# Patient Record
Sex: FEMALE | Race: WHITE | NOT HISPANIC OR LATINO | Employment: PART TIME | URBAN - METROPOLITAN AREA
[De-identification: names, ages, dates, MRNs, and addresses within clinical notes are randomized per-mention and may not be internally consistent; named-entity substitution may affect disease eponyms.]

---

## 2017-01-10 ENCOUNTER — HOSPITAL ENCOUNTER (OUTPATIENT)
Dept: LAB | Facility: MEDICAL CENTER | Age: 57
End: 2017-01-10
Attending: INTERNAL MEDICINE
Payer: COMMERCIAL

## 2017-01-10 LAB
ALBUMIN SERPL BCP-MCNC: 3.6 G/DL (ref 3.2–4.9)
ALBUMIN/GLOB SERPL: 1.2 G/DL
ALP SERPL-CCNC: 67 U/L (ref 30–99)
ALT SERPL-CCNC: 10 U/L (ref 2–50)
ANION GAP SERPL CALC-SCNC: 8 MMOL/L (ref 0–11.9)
AST SERPL-CCNC: 17 U/L (ref 12–45)
BASOPHILS # BLD AUTO: 0.16 K/UL (ref 0–0.12)
BASOPHILS NFR BLD AUTO: 1.7 % (ref 0–1.8)
BILIRUB SERPL-MCNC: 0.4 MG/DL (ref 0.1–1.5)
BUN SERPL-MCNC: 28 MG/DL (ref 8–22)
CALCIUM SERPL-MCNC: 9.7 MG/DL (ref 8.5–10.5)
CHLORIDE SERPL-SCNC: 106 MMOL/L (ref 96–112)
CHOLEST SERPL-MCNC: 217 MG/DL (ref 100–199)
CO2 SERPL-SCNC: 30 MMOL/L (ref 20–33)
CREAT SERPL-MCNC: 1.06 MG/DL (ref 0.5–1.4)
CREAT UR-MCNC: 68.6 MG/DL
EOSINOPHIL # BLD: 0.44 K/UL (ref 0–0.51)
EOSINOPHIL NFR BLD AUTO: 4.6 % (ref 0–6.9)
ERYTHROCYTE [DISTWIDTH] IN BLOOD BY AUTOMATED COUNT: 48.1 FL (ref 35.9–50)
EST. AVERAGE GLUCOSE BLD GHB EST-MCNC: 117 MG/DL
GLOBULIN SER CALC-MCNC: 3 G/DL (ref 1.9–3.5)
GLUCOSE SERPL-MCNC: 56 MG/DL (ref 65–99)
HBA1C MFR BLD: 5.7 % (ref 0–5.6)
HCT VFR BLD AUTO: 48.5 % (ref 37–47)
HDLC SERPL-MCNC: 44 MG/DL
HGB BLD-MCNC: 15.4 G/DL (ref 12–16)
IMM GRANULOCYTES # BLD AUTO: 0.02 K/UL (ref 0–0.11)
IMM GRANULOCYTES NFR BLD AUTO: 0.2 % (ref 0–0.9)
LDLC SERPL CALC-MCNC: 108 MG/DL
LYMPHOCYTES # BLD: 2.6 K/UL (ref 1–4.8)
LYMPHOCYTES NFR BLD AUTO: 27.4 % (ref 22–41)
MCH RBC QN AUTO: 27.3 PG (ref 27–33)
MCHC RBC AUTO-ENTMCNC: 31.8 G/DL (ref 33.6–35)
MCV RBC AUTO: 85.8 FL (ref 81.4–97.8)
MICROALBUMIN UR-MCNC: 19.8 MG/DL
MICROALBUMIN/CREAT UR: 289 MG/G (ref 0–30)
MONOCYTES # BLD: 0.63 K/UL (ref 0–0.85)
MONOCYTES NFR BLD AUTO: 6.6 % (ref 0–13.4)
NEUTROPHILS # BLD: 5.64 K/UL (ref 2–7.15)
NEUTROPHILS NFR BLD AUTO: 59.5 % (ref 44–72)
NRBC # BLD AUTO: 0 K/UL
NRBC BLD-RTO: 0 /100 WBC
PLATELET # BLD AUTO: 265 K/UL (ref 164–446)
PMV BLD AUTO: 11.8 FL (ref 9–12.9)
POTASSIUM SERPL-SCNC: 4.2 MMOL/L (ref 3.6–5.5)
PROT SERPL-MCNC: 6.6 G/DL (ref 6–8.2)
RBC # BLD AUTO: 5.65 M/UL (ref 4.2–5.4)
SODIUM SERPL-SCNC: 144 MMOL/L (ref 135–145)
TRIGL SERPL-MCNC: 326 MG/DL (ref 0–149)
WBC # BLD AUTO: 9.5 K/UL (ref 4.8–10.8)

## 2017-01-10 PROCEDURE — 82570 ASSAY OF URINE CREATININE: CPT

## 2017-01-10 PROCEDURE — 36415 COLL VENOUS BLD VENIPUNCTURE: CPT

## 2017-01-10 PROCEDURE — 80053 COMPREHEN METABOLIC PANEL: CPT

## 2017-01-10 PROCEDURE — 83036 HEMOGLOBIN GLYCOSYLATED A1C: CPT

## 2017-01-10 PROCEDURE — 85025 COMPLETE CBC W/AUTO DIFF WBC: CPT

## 2017-01-10 PROCEDURE — 80061 LIPID PANEL: CPT

## 2017-01-10 PROCEDURE — 82043 UR ALBUMIN QUANTITATIVE: CPT

## 2017-03-07 ENCOUNTER — HOSPITAL ENCOUNTER (OUTPATIENT)
Dept: RADIOLOGY | Facility: MEDICAL CENTER | Age: 57
End: 2017-03-07
Attending: SURGERY
Payer: COMMERCIAL

## 2017-03-07 DIAGNOSIS — I70.209 ATHEROSCLEROSIS OF NATIVE ARTERIES OF THE EXTREMITIES, UNSPECIFIED: ICD-10-CM

## 2017-03-07 PROCEDURE — 93926 LOWER EXTREMITY STUDY: CPT | Mod: 26 | Performed by: SURGERY

## 2017-03-07 PROCEDURE — 93922 UPR/L XTREMITY ART 2 LEVELS: CPT

## 2017-03-07 PROCEDURE — 93922 UPR/L XTREMITY ART 2 LEVELS: CPT | Mod: 26 | Performed by: SURGERY

## 2017-03-07 PROCEDURE — 93926 LOWER EXTREMITY STUDY: CPT

## 2017-04-25 ENCOUNTER — HOSPITAL ENCOUNTER (OUTPATIENT)
Dept: LAB | Facility: MEDICAL CENTER | Age: 57
End: 2017-04-25
Attending: PHYSICIAN ASSISTANT
Payer: COMMERCIAL

## 2017-04-25 LAB
ALBUMIN SERPL BCP-MCNC: 4 G/DL (ref 3.2–4.9)
ALBUMIN/GLOB SERPL: 1.5 G/DL
ALP SERPL-CCNC: 66 U/L (ref 30–99)
ALT SERPL-CCNC: 13 U/L (ref 2–50)
ANION GAP SERPL CALC-SCNC: 7 MMOL/L (ref 0–11.9)
AST SERPL-CCNC: 21 U/L (ref 12–45)
BILIRUB SERPL-MCNC: 0.4 MG/DL (ref 0.1–1.5)
BUN SERPL-MCNC: 37 MG/DL (ref 8–22)
CALCIUM SERPL-MCNC: 8.8 MG/DL (ref 8.5–10.5)
CHLORIDE SERPL-SCNC: 105 MMOL/L (ref 96–112)
CHOLEST SERPL-MCNC: 202 MG/DL (ref 100–199)
CO2 SERPL-SCNC: 28 MMOL/L (ref 20–33)
CREAT SERPL-MCNC: 1.18 MG/DL (ref 0.5–1.4)
EST. AVERAGE GLUCOSE BLD GHB EST-MCNC: 123 MG/DL
GFR SERPL CREATININE-BSD FRML MDRD: 47 ML/MIN/1.73 M 2
GLOBULIN SER CALC-MCNC: 2.6 G/DL (ref 1.9–3.5)
GLUCOSE SERPL-MCNC: 79 MG/DL (ref 65–99)
HBA1C MFR BLD: 5.9 % (ref 0–5.6)
HDLC SERPL-MCNC: 45 MG/DL
LDLC SERPL CALC-MCNC: 107 MG/DL
POTASSIUM SERPL-SCNC: 4.6 MMOL/L (ref 3.6–5.5)
PROT SERPL-MCNC: 6.6 G/DL (ref 6–8.2)
SODIUM SERPL-SCNC: 140 MMOL/L (ref 135–145)
TRIGL SERPL-MCNC: 250 MG/DL (ref 0–149)

## 2017-04-25 PROCEDURE — 80053 COMPREHEN METABOLIC PANEL: CPT

## 2017-04-25 PROCEDURE — 83036 HEMOGLOBIN GLYCOSYLATED A1C: CPT

## 2017-04-25 PROCEDURE — 80061 LIPID PANEL: CPT

## 2017-04-25 PROCEDURE — 36415 COLL VENOUS BLD VENIPUNCTURE: CPT

## 2017-07-03 ENCOUNTER — HOSPITAL ENCOUNTER (EMERGENCY)
Facility: MEDICAL CENTER | Age: 57
End: 2017-07-03
Attending: EMERGENCY MEDICINE
Payer: COMMERCIAL

## 2017-07-03 VITALS
WEIGHT: 256.62 LBS | RESPIRATION RATE: 18 BRPM | HEIGHT: 64 IN | HEART RATE: 81 BPM | SYSTOLIC BLOOD PRESSURE: 141 MMHG | DIASTOLIC BLOOD PRESSURE: 62 MMHG | TEMPERATURE: 98.6 F | OXYGEN SATURATION: 93 % | BODY MASS INDEX: 43.81 KG/M2
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
## 2017-07-03 DIAGNOSIS — M54.50 ACUTE MIDLINE LOW BACK PAIN WITHOUT SCIATICA: ICD-10-CM

## 2017-07-03 PROCEDURE — 99283 EMERGENCY DEPT VISIT LOW MDM: CPT

## 2017-07-03 RX ORDER — LIDOCAINE 50 MG/G
1 PATCH TOPICAL EVERY 24 HOURS
Qty: 10 PATCH | Refills: 1 | Status: SHIPPED | OUTPATIENT
Start: 2017-07-03

## 2017-07-03 RX ORDER — PREDNISONE 20 MG/1
40 TABLET ORAL DAILY
Qty: 12 TAB | Refills: 0 | Status: SHIPPED | OUTPATIENT
Start: 2017-07-03 | End: 2017-07-09

## 2017-07-03 ASSESSMENT — LIFESTYLE VARIABLES: DO YOU DRINK ALCOHOL: NO

## 2017-07-03 NOTE — ED NOTES
Ambulates to triage with a cane  Chief Complaint   Patient presents with   • Low Back Pain     fell last Tueday and landed on her back, denies any numbness/tingling or shooting pain to legs     -LOC, VSS, has been taking oxycodone for her pain, last dose was at 7am, meds not helping.

## 2017-07-03 NOTE — ED AVS SNAPSHOT
7/3/2017    Suly Rodríguez  [address] [place] [address]    Dear Suly:    [facility] wants to ensure your discharge home is safe and you or your loved ones have had all of your questions answered regarding your care after you leave the hospital.    Below is a list of resources and contact information should you have any questions regarding your hospital stay, follow-up instructions, or active medical symptoms.    Questions or Concerns Regarding… Contact   Medical Questions Related to Your Discharge  (7 days a week, 8am-5pm) Contact a Nurse Care Coordinator   [phone]   Medical Questions Not Related to Your Discharge  (24 hours a day / 7 days a week)  Contact the Nurse Health Line   [phone]    Medications or Discharge Instructions Refer to your discharge packet   or contact your [facility] Primary Care Provider   [phone]   Follow-up Appointment(s) Schedule your appointment via [employer]   or contact Scheduling [phone]   Billing Review your statement via [employer]  or contact Billing [phone]   Medical Records Review your records via [employer]   or contact Medical Records [phone]     You may receive a telephone call within two days of discharge. This call is to make certain you understand your discharge instructions and have the opportunity to have any questions answered. You can also easily access your medical information, test results and upcoming appointments via the [employer] free online health management tool. You can learn more and sign up at [employer]. For assistance setting up your [employer] account, please call [phone].    Once again, we want to ensure your discharge home is safe and that you have a clear understanding of any next steps in your care. If you have any questions or concerns, please do not hesitate to contact us, we are here for you. Thank you for choosing [facility] for your healthcare needs.    Sincerely,    Your [facility] Healthcare Team

## 2017-07-03 NOTE — DISCHARGE INSTRUCTIONS
Back Injury Prevention  Back injuries can be very painful. They can also be difficult to heal. After having one back injury, you are more likely to injure your back again. It is important to learn how to avoid injuring or re-injuring your back. The following tips can help you to prevent a back injury.  WHAT SHOULD I KNOW ABOUT PHYSICAL FITNESS?  · Exercise for 30 minutes per day on most days of the week or as directed by your health care provider. Make sure to:  · Do aerobic exercises, such as walking, jogging, biking, or swimming.  · Do exercises that increase balance and strength, such as darío chi and yoga. These can decrease your risk of falling and injuring your back.  · Do stretching exercises to help with flexibility.  · Try to develop strong abdominal muscles. Your abdominal muscles provide a lot of the support that is needed by your back.  · Maintain a healthy weight.  This helps to decrease your risk of a back injury.  WHAT SHOULD I KNOW ABOUT MY DIET?  · Talk with your health care provider about your overall diet. Take supplements and vitamins only as directed by your health care provider.  · Talk with your health care provider about how much calcium and vitamin D you need each day. These nutrients help to prevent weakening of the bones (osteoporosis). Osteoporosis can cause broken (fractured) bones, which lead to back pain.  · Include good sources of calcium in your diet, such as dairy products, green leafy vegetables, and products that have had calcium added to them (fortified).  · Include good sources of vitamin D in your diet, such as milk and foods that are fortified with vitamin D.  WHAT SHOULD I KNOW ABOUT MY POSTURE?  · Sit up straight and stand up straight. Avoid leaning forward when you sit or hunching over when you stand.  · Choose chairs that have good low-back (lumbar) support.  · If you work at a desk, sit close to it so you do not need to lean over. Keep your chin tucked in. Keep your neck  "drawn back, and keep your elbows bent at a right angle. Your arms should look like the letter \"L.\"  · Sit high and close to the steering wheel when you drive. Add a lumbar support to your car seat, if needed.  · Avoid sitting or standing in one position for very long. Take breaks to get up, stretch, and walk around at least one time every hour. Take breaks every hour if you are driving for long periods of time.  · Sleep on your side with your knees slightly bent, or sleep on your back with a pillow under your knees. Do not lie on the front of your body to sleep.  WHAT SHOULD I KNOW ABOUT LIFTING, TWISTING, AND REACHING?  Lifting and Heavy Lifting  · Avoid heavy lifting, especially repetitive heavy lifting. If you must do heavy lifting:  · Stretch before lifting.  · Work slowly.  · Rest between lifts.  · Use a tool such as a cart or a jenni to move objects if one is available.  · Make several small trips instead of carrying one heavy load.  · Ask for help when you need it, especially when moving big objects.  · Follow these steps when lifting:  · Stand with your feet shoulder-width apart.  · Get as close to the object as you can. Do not try to  a heavy object that is far from your body.  · Use handles or lifting straps if they are available.  · Bend at your knees. Squat down, but keep your heels off the floor.  · Keep your shoulders pulled back, your chin tucked in, and your back straight.  · Lift the object slowly while you tighten the muscles in your legs, abdomen, and buttocks. Keep the object as close to the center of your body as possible.  · Follow these steps when putting down a heavy load:  · Stand with your feet shoulder-width apart.  · Lower the object slowly while you tighten the muscles in your legs, abdomen, and buttocks. Keep the object as close to the center of your body as possible.  · Keep your shoulders pulled back, your chin tucked in, and your back straight.  · Bend at your knees. Squat " down, but keep your heels off the floor.  · Use handles or lifting straps if they are available.  Twisting and Reaching  · Avoid lifting heavy objects above your waist.  · Do not twist at your waist while you are lifting or carrying a load. If you need to turn, move your feet.  · Do not bend over without bending at your knees.  · Avoid reaching over your head, across a table, or for an object on a high surface.  WHAT ARE SOME OTHER TIPS?  · Avoid wet floors and icy ground. Keep sidewalks clear of ice to prevent falls.  · Do not sleep on a mattress that is too soft or too hard.  · Keep items that are used frequently within easy reach.  · Put heavier objects on shelves at waist level, and put lighter objects on lower or higher shelves.  · Find ways to decrease your stress, such as exercise, massage, or relaxation techniques. Stress can build up in your muscles. Tense muscles are more vulnerable to injury.  · Talk with your health care provider if you feel anxious or depressed. These conditions can make back pain worse.  · Wear flat heel shoes with cushioned soles.  · Avoid sudden movements.  · Use both shoulder straps when carrying a backpack.  · Do not use any tobacco products, including cigarettes, chewing tobacco, or electronic cigarettes. If you need help quitting, ask your health care provider.     This information is not intended to replace advice given to you by your health care provider. Make sure you discuss any questions you have with your health care provider.     Document Released: 01/25/2006 Document Revised: 05/03/2016 Document Reviewed: 12/22/2015  [employer] Interactive Patient Education ©2016 [employer]    Back Pain, Adult  Back pain is very common in adults. The cause of back pain is rarely dangerous and the pain often gets better over time. The cause of your back pain may not be known. Some common causes of back pain include:  · Strain of the muscles or ligaments supporting the spine.  · Wear and  tear (degeneration) of the spinal disks.  · Arthritis.  · Direct injury to the back.  For many people, back pain may return. Since back pain is rarely dangerous, most people can learn to manage this condition on their own.  HOME CARE INSTRUCTIONS  Watch your back pain for any changes. The following actions may help to lessen any discomfort you are feeling:  · Remain active. It is stressful on your back to sit or  one place for long periods of time. Do not sit, drive, or  one place for more than 30 minutes at a time. Take short walks on even surfaces as soon as you are able. Try to increase the length of time you walk each day.  · Exercise regularly as directed by your health care provider. Exercise helps your back heal faster. It also helps avoid future injury by keeping your muscles strong and flexible.  · Do not stay in bed. Resting more than 1-2 days can delay your recovery.  · Pay attention to your body when you bend and lift. The most comfortable positions are those that put less stress on your recovering back. Always use proper lifting techniques, including:  ¨ Bending your knees.  ¨ Keeping the load close to your body.  ¨ Avoiding twisting.  · Find a comfortable position to sleep. Use a firm mattress and lie on your side with your knees slightly bent. If you lie on your back, put a pillow under your knees.  · Avoid feeling anxious or stressed. Stress increases muscle tension and can worsen back pain. It is important to recognize when you are anxious or stressed and learn ways to manage it, such as with exercise.  · Take medicines only as directed by your health care provider. Over-the-counter medicines to reduce pain and inflammation are often the most helpful. Your health care provider may prescribe muscle relaxant drugs. These medicines help dull your pain so you can more quickly return to your normal activities and healthy exercise.  · Apply ice to the injured area:  ¨ Put ice in a plastic  bag.  ¨ Place a towel between your skin and the bag.  ¨ Leave the ice on for 20 minutes, 2-3 times a day for the first 2-3 days. After that, ice and heat may be alternated to reduce pain and spasms.  · Maintain a healthy weight. Excess weight puts extra stress on your back and makes it difficult to maintain good posture.  SEEK MEDICAL CARE IF:  · You have pain that is not relieved with rest or medicine.  · You have increasing pain going down into the legs or buttocks.  · You have pain that does not improve in one week.  · You have night pain.  · You lose weight.  · You have a fever or chills.  SEEK IMMEDIATE MEDICAL CARE IF:   · You develop new bowel or bladder control problems.  · You have unusual weakness or numbness in your arms or legs.  · You develop nausea or vomiting.  · You develop abdominal pain.  · You feel faint.     This information is not intended to replace advice given to you by your health care provider. Make sure you discuss any questions you have with your health care provider.     Document Released: 12/18/2006 Document Revised: 01/08/2016 Document Reviewed: 04/21/2015  [employer] Interactive Patient Education ©2016 [employer]

## 2017-07-03 NOTE — ED AVS SNAPSHOT
Home Care Instructions                                                                                                                Suly Rodríguez   MRN: [id number]    Department:  [facility], Emergency Dept   Date of Visit:  7/3/2017            [facility], Emergency Dept    [address] [address]    Phone:  [phone]      You were seen by     Deborah Cooper M.D.      Your Diagnosis Was     Fall, initial encounter     W19.XXXA       Follow-up Information     1. Follow up with Jimmy Song M.D.. Call in 2 days.    Specialty:  Family Medicine    Why:  for recheck, As needed, If symptoms worsen    Contact information    [address]  [address]  [facility] [address]  [phone]          2. Follow up with [facility], Emergency Dept.    Specialty:  Emergency Medicine    Why:  As needed, If symptoms worsen    Contact information    [address]  [place] [address] [phone]      Medication Information     Review all of your home medications and newly ordered medications with your primary doctor and/or pharmacist as soon as possible. Follow medication instructions as directed by your doctor and/or pharmacist.     Please keep your complete medication list with you and share with your physician. Update the information when medications are discontinued, doses are changed, or new medications (including over-the-counter products) are added; and carry medication information at all times in the event of emergency situations.               Medication List      START taking these medications        Instructions    Morning Afternoon Evening Bedtime    lidocaine 5 % Ptch   Commonly known as:  LIDODERM        Apply 1 Patch to skin as directed every 24 hours.   Dose:  1 Patch                        predniSONE 20 MG Tabs   Commonly known as:  DELTASONE        Take 2 Tabs by mouth every day for 6 days.   Dose:  40 mg                          ASK your  doctor about these medications        Instructions    Morning Afternoon Evening Bedtime    alprazolam 0.25 MG Tabs   Commonly known as:  XANAX        Take 0.25 mg by mouth 3 times a day as needed for Sleep or Anxiety. Indications: Feeling Anxious   Dose:  0.25 mg                        amlodipine 10 MG Tabs   Commonly known as:  NORVASC        Take 5 mg by mouth every day. Indications: High Blood Pressure   Dose:  5 mg                        aspirin 81 MG Chew chewable tablet   Commonly known as:  ASA        Take 162 mg by mouth every day.   Dose:  162 mg                        clindamycin 300 MG Caps   Commonly known as:  CLEOCIN        Take 1 Cap by mouth every 8 hours.   Dose:  300 mg                        clopidogrel 75 MG Tabs   Commonly known as:  PLAVIX        Take 1 Tab by mouth every day.   Dose:  75 mg                        cyanocobalamin 500 MCG Tabs   Commonly known as:  VITAMIN B-12        Take 500 mcg by mouth every day.   Dose:  500 mcg                        escitalopram 20 MG tablet   Commonly known as:  LEXAPRO        Take 20 mg by mouth every day.   Dose:  20 mg                        fish oil 1000 MG Caps capsule        Take 1,000 mg by mouth every day.   Dose:  1000 mg                        furosemide 20 MG Tabs   Commonly known as:  LASIX        Take 20 mg by mouth every day.   Dose:  20 mg                        gabapentin 300 MG Caps   Commonly known as:  NEURONTIN        Take 300 mg by mouth every day.   Dose:  300 mg                        insulin aspart 100 UNIT/ML Soln   Commonly known as:  NOVOLOG        Inject 16 Units as instructed BID AC(S).   Dose:  16 Units                        LEVEMIR FLEXTOUCH 100 UNIT/ML Sopn injection   Generic drug:  insulin detemir        Inject 70 Units as instructed every evening.   Dose:  70 Units                        lisinopril 20 MG Tabs   Commonly known as:  PRINIVIL        Take 20 mg by mouth every day.   Dose:  20 mg                         metformin  MG Tb24   Commonly known as:  GLUCOPHAGE XR        Take 500 mg by mouth every day.   Dose:  500 mg                        oxycodone-acetaminophen  MG Tabs   Commonly known as:  PERCOCET-10        Take 1 Tab by mouth every 6 hours as needed for Moderate Pain.   Dose:  1 Tab                        therapeutic multivitamin-minerals Tabs        Take 1 Tab by mouth every day.   Dose:  1 Tab                        vitamin D3 5000 UNITS Caps        Take 1 Cap by mouth every day.   Dose:  1 Cap                        zolpidem 10 MG Tabs   Commonly known as:  AMBIEN        Take 10 mg by mouth at bedtime as needed for Sleep.   Dose:  10 mg                             Where to Get Your Medications      These medications were sent to [id number] - [place] - [address]  [address] [place] [address]     Phone:  [phone]    - lidocaine 5 % Ptch  - predniSONE 20 MG Tabs              Discharge Instructions       Back Injury Prevention  Back injuries can be very painful. They can also be difficult to heal. After having one back injury, you are more likely to injure your back again. It is important to learn how to avoid injuring or re-injuring your back. The following tips can help you to prevent a back injury.  WHAT SHOULD I KNOW ABOUT PHYSICAL FITNESS?  · Exercise for 30 minutes per day on most days of the week or as directed by your health care provider. Make sure to:  · Do aerobic exercises, such as walking, jogging, biking, or swimming.  · Do exercises that increase balance and strength, such as darío chi and yoga. These can decrease your risk of falling and injuring your back.  · Do stretching exercises to help with flexibility.  · Try to develop strong abdominal muscles. Your abdominal muscles provide a lot of the support that is needed by your back.  · Maintain a healthy weight.  This helps to decrease your risk of a back injury.  WHAT SHOULD I KNOW ABOUT MY DIET?  · Talk with  "your health care provider about your overall diet. Take supplements and vitamins only as directed by your health care provider.  · Talk with your health care provider about how much calcium and vitamin D you need each day. These nutrients help to prevent weakening of the bones (osteoporosis). Osteoporosis can cause broken (fractured) bones, which lead to back pain.  · Include good sources of calcium in your diet, such as dairy products, green leafy vegetables, and products that have had calcium added to them (fortified).  · Include good sources of vitamin D in your diet, such as milk and foods that are fortified with vitamin D.  WHAT SHOULD I KNOW ABOUT MY POSTURE?  · Sit up straight and stand up straight. Avoid leaning forward when you sit or hunching over when you stand.  · Choose chairs that have good low-back (lumbar) support.  · If you work at a desk, sit close to it so you do not need to lean over. Keep your chin tucked in. Keep your neck drawn back, and keep your elbows bent at a right angle. Your arms should look like the letter \"L.\"  · Sit high and close to the steering wheel when you drive. Add a lumbar support to your car seat, if needed.  · Avoid sitting or standing in one position for very long. Take breaks to get up, stretch, and walk around at least one time every hour. Take breaks every hour if you are driving for long periods of time.  · Sleep on your side with your knees slightly bent, or sleep on your back with a pillow under your knees. Do not lie on the front of your body to sleep.  WHAT SHOULD I KNOW ABOUT LIFTING, TWISTING, AND REACHING?  Lifting and Heavy Lifting  · Avoid heavy lifting, especially repetitive heavy lifting. If you must do heavy lifting:  · Stretch before lifting.  · Work slowly.  · Rest between lifts.  · Use a tool such as a cart or a jenni to move objects if one is available.  · Make several small trips instead of carrying one heavy load.  · Ask for help when you need it, " especially when moving big objects.  · Follow these steps when lifting:  · Stand with your feet shoulder-width apart.  · Get as close to the object as you can. Do not try to  a heavy object that is far from your body.  · Use handles or lifting straps if they are available.  · Bend at your knees. Squat down, but keep your heels off the floor.  · Keep your shoulders pulled back, your chin tucked in, and your back straight.  · Lift the object slowly while you tighten the muscles in your legs, abdomen, and buttocks. Keep the object as close to the center of your body as possible.  · Follow these steps when putting down a heavy load:  · Stand with your feet shoulder-width apart.  · Lower the object slowly while you tighten the muscles in your legs, abdomen, and buttocks. Keep the object as close to the center of your body as possible.  · Keep your shoulders pulled back, your chin tucked in, and your back straight.  · Bend at your knees. Squat down, but keep your heels off the floor.  · Use handles or lifting straps if they are available.  Twisting and Reaching  · Avoid lifting heavy objects above your waist.  · Do not twist at your waist while you are lifting or carrying a load. If you need to turn, move your feet.  · Do not bend over without bending at your knees.  · Avoid reaching over your head, across a table, or for an object on a high surface.  WHAT ARE SOME OTHER TIPS?  · Avoid wet floors and icy ground. Keep sidewalks clear of ice to prevent falls.  · Do not sleep on a mattress that is too soft or too hard.  · Keep items that are used frequently within easy reach.  · Put heavier objects on shelves at waist level, and put lighter objects on lower or higher shelves.  · Find ways to decrease your stress, such as exercise, massage, or relaxation techniques. Stress can build up in your muscles. Tense muscles are more vulnerable to injury.  · Talk with your health care provider if you feel anxious or depressed.  These conditions can make back pain worse.  · Wear flat heel shoes with cushioned soles.  · Avoid sudden movements.  · Use both shoulder straps when carrying a backpack.  · Do not use any tobacco products, including cigarettes, chewing tobacco, or electronic cigarettes. If you need help quitting, ask your health care provider.     This information is not intended to replace advice given to you by your health care provider. Make sure you discuss any questions you have with your health care provider.     Document Released: 01/25/2006 Document Revised: 05/03/2016 Document Reviewed: 12/22/2015  to be Interactive Patient Education ©2016 [employer] Inc.    Back Pain, Adult  Back pain is very common in adults. The cause of back pain is rarely dangerous and the pain often gets better over time. The cause of your back pain may not be known. Some common causes of back pain include:  · Strain of the muscles or ligaments supporting the spine.  · Wear and tear (degeneration) of the spinal disks.  · Arthritis.  · Direct injury to the back.  For many people, back pain may return. Since back pain is rarely dangerous, most people can learn to manage this condition on their own.  HOME CARE INSTRUCTIONS  Watch your back pain for any changes. The following actions may help to lessen any discomfort you are feeling:  · Remain active. It is stressful on your back to sit or  one place for long periods of time. Do not sit, drive, or  one place for more than 30 minutes at a time. Take short walks on even surfaces as soon as you are able. Try to increase the length of time you walk each day.  · Exercise regularly as directed by your health care provider. Exercise helps your back heal faster. It also helps avoid future injury by keeping your muscles strong and flexible.  · Do not stay in bed. Resting more than 1-2 days can delay your recovery.  · Pay attention to your body when you bend and lift. The most comfortable positions  are those that put less stress on your recovering back. Always use proper lifting techniques, including:  ¨ Bending your knees.  ¨ Keeping the load close to your body.  ¨ Avoiding twisting.  · Find a comfortable position to sleep. Use a firm mattress and lie on your side with your knees slightly bent. If you lie on your back, put a pillow under your knees.  · Avoid feeling anxious or stressed. Stress increases muscle tension and can worsen back pain. It is important to recognize when you are anxious or stressed and learn ways to manage it, such as with exercise.  · Take medicines only as directed by your health care provider. Over-the-counter medicines to reduce pain and inflammation are often the most helpful. Your health care provider may prescribe muscle relaxant drugs. These medicines help dull your pain so you can more quickly return to your normal activities and healthy exercise.  · Apply ice to the injured area:  ¨ Put ice in a plastic bag.  ¨ Place a towel between your skin and the bag.  ¨ Leave the ice on for 20 minutes, 2-3 times a day for the first 2-3 days. After that, ice and heat may be alternated to reduce pain and spasms.  · Maintain a healthy weight. Excess weight puts extra stress on your back and makes it difficult to maintain good posture.  SEEK MEDICAL CARE IF:  · You have pain that is not relieved with rest or medicine.  · You have increasing pain going down into the legs or buttocks.  · You have pain that does not improve in one week.  · You have night pain.  · You lose weight.  · You have a fever or chills.  SEEK IMMEDIATE MEDICAL CARE IF:   · You develop new bowel or bladder control problems.  · You have unusual weakness or numbness in your arms or legs.  · You develop nausea or vomiting.  · You develop abdominal pain.  · You feel faint.     This information is not intended to replace advice given to you by your health care provider. Make sure you discuss any questions you have with your  health care provider.     Document Released: 12/18/2006 Document Revised: 01/08/2016 Document Reviewed: 04/21/2015  Elsevier Interactive Patient Education ©2016 [employer] Inc.            Patient Information     Patient Information    Following emergency treatment: all patient requiring follow-up care must return either to a private physician or a clinic if your condition worsens before you are able to obtain further medical attention, please return to the emergency room.     Billing Information    At [facility], we work to make the billing process streamlined for our patients.  Our Representatives are here to answer any questions you may have regarding your hospital bill.  If you have insurance coverage and have supplied your insurance information to us, we will submit a claim to your insurer on your behalf.  Should you have any questions regarding your bill, we can be reached online or by phone as follows:  Online: You are able pay your bills online or live chat with our representatives about any billing questions you may have. We are here to help Monday - Friday from 8:00am to 7:30pm and 9:00am - 12:00pm on Saturdays.  Please visit [email or web address]  for more information.   Phone:  [phone] or [phone]    Please note that your emergency physician, surgeon, pathologist, radiologist, anesthesiologist, and other specialists are not employed by [facility] and will therefore bill separately for their services.  Please contact them directly for any questions concerning their bills at the numbers below:     Emergency Physician Services:  [phone]  [facility]:  [phone]  [facility]:  [phone]  [facility]:  [phone]    1. Your final bill may vary from the amount quoted upon discharge if all procedures are not complete at that time, or if your doctor has additional procedures of which we are not aware. You will  receive an additional bill if you return to the Emergency Department at [facility] for suture [facility] regardless of the facility of which the sutures were placed.     2. Please arrange for settlement of this account at the emergency registration.    3. All self-pay accounts are due in full at the time of treatment.  If you are unable to meet this obligation then payment is expected within 4-5 days.     4. If you have had radiology studies (CT, X-ray, Ultrasound, MRI), you have received a preliminary result during your emergency department visit. Please contact the radiology department [phone] to receive a copy of your final result. Please discuss the Final result with your primary physician or with the follow up physician provided.     Crisis Hotline:  [place] Crisis Hotline:  5-178-EFSXBKL or 1-962.651.1012  [place] Crisis Hotline:    1-891.118.7520 or 160-385-6097         ED Discharge Follow Up Questions    1. In order to provide you with very good care, we would like to follow up with a phone call in the next few days.  May we have your permission to contact you?     YES /  NO    2. What is the best phone number to call you? (       )_____-__________    3. What is the best time to call you?      Morning  /  Afternoon  /  Evening                   Patient Signature:  ____________________________________________________________    Date:  ____________________________________________________________

## 2017-07-03 NOTE — ED AVS SNAPSHOT
Perfect Earth Access Code: [email or web address]  Expires: 7/21/2017  4:02 AM    Perfect Earth  A secure, online tool to manage your health information     [employer] Perfect Earth® is a secure, online tool that connects you to your personalized health information from the privacy of your home -- day or night - making it very easy for you to manage your healthcare. Once the activation process is completed, you can even access your medical information using the Perfect Earth timmy, which is available for free in the Apple Timmy store or Google Play store.     Perfect Earth provides the following levels of access (as shown below):   My Chart Features   [facility] Primary Care Doctor [facility]  Specialists [facility]  Urgent  Care Non-[facility]  Primary Care  Doctor   Email your healthcare team securely and privately 24/7 X X X X   Manage appointments: schedule your next appointment; view details of past/upcoming appointments X      Request prescription refills. X      View recent personal medical records, including lab and immunizations X X X X   View health record, including health history, allergies, medications X X X X   Read reports about your outpatient visits, procedures, consult and ER notes X X X X   See your discharge summary, which is a recap of your hospital and/or ER visit that includes your diagnosis, lab results, and care plan. X X       How to register for Perfect Earth:  1. Go to  [email or web address].  2. Click on the Sign Up Now box, which takes you to the New Member Sign Up page. You will need to provide the following information:  a. Enter your Perfect Earth Access Code exactly as it appears at the top of this page. (You will not need to use this code after you’ve completed the sign-up process. If you do not sign up before the expiration date, you must request a new code.)   b. Enter your date of birth.   c. Enter your home email address.   d. Click Submit, and follow the next screen’s instructions.  3. Create a Perfect Earth ID. This will be your Perfect Earth  login ID and cannot be changed, so think of one that is secure and easy to remember.  4. Create a [employer] password. You can change your password at any time.  5. Enter your Password Reset Question and Answer. This can be used at a later time if you forget your password.   6. Enter your e-mail address. This allows you to receive e-mail notifications when new information is available in [employer].  7. Click Sign Up. You can now view your health information.    For assistance activating your [employer] account, call [phone]

## 2017-07-03 NOTE — ED PROVIDER NOTES
ED Provider Note    Scribed for Deborah Cooper M.D. by Nasreen Lora. 7/3/2017  3:37 PM    Primary care provider: Jimmy Song M.D.  Means of arrival: Walk-in  History obtained from: Patient  History limited by: None    CHIEF COMPLAINT  Chief Complaint   Patient presents with   • Low Back Pain     fell last Tueday and landed on her back, denies any numbness/tingling or shooting pain to legs       HPI  Suly Rodríguez is a 57 y.o. female who presents to the Emergency Department for low back pain onset 6 days ago. She reports that she had a mechanical ground level fall 6 days ago, landed on her back and has been experiencing pain since then. She states that her pain medications have not been helping to alleviate the pain. When the patient was seen at her chiropractor's office, she had an X-ray done and was told that it was negative. The patient denies any incontinence, numbness in her legs, saddle paraesthesia, chest pain, shortness of breath, or loss of consciousness with the fall.     REVIEW OF SYSTEMS    Musculoskeletal: no swelling, deformity, or joint swelling. Lower back pain  Neuro: no weakness, numbness or loss of bowel or bladder control  Endocrine: no fevers, sweating, or weight loss   SKIN: no rash, erythema, or contusions   E  See history of present illness.     PAST MEDICAL HISTORY   has a past medical history of Hypertension; Diabetes; Unspecified cataract; Renal disorder; Dental disorder; Stroke (CMS-HCC) (2013); and Depression.    SURGICAL HISTORY   has past surgical history that includes recovery (1/10/2014); femoral tibial bypass (2/3/2014); irrigation & debridement general (2/24/2014); recovery (6/25/2014); recovery (5/20/2015); irrigation & debridement general (Right, 12/16/2015); other orthopedic surgery (2007); toe amputation (2/7/2014); toe amputation (2/24/2014); knee amputation below (Right, 12/2/2015); debridement (Right, 2/5/2016); and cataract phaco with iol (Left, 8/10/2016).    SOCIAL  HISTORY  Social History   Substance Use Topics   • Smoking status: Never Smoker    • Smokeless tobacco: Never Used   • Alcohol Use: Yes      Comment: 1 per week      History   Drug Use No       FAMILY HISTORY  No pertinentfamily history    CURRENT MEDICATIONS    No current facility-administered medications on file prior to encounter.     Current Outpatient Prescriptions on File Prior to Encounter   Medication Sig Dispense Refill   • clindamycin (CLEOCIN) 300 MG Cap Take 1 Cap by mouth every 8 hours. 21 Cap 0   • escitalopram (LEXAPRO) 20 MG tablet Take 20 mg by mouth every day.     • amlodipine (NORVASC) 10 MG Tab Take 5 mg by mouth every day. Indications: High Blood Pressure     • furosemide (LASIX) 20 MG Tab Take 20 mg by mouth every day.     • cyanocobalamin (VITAMIN B-12) 500 MCG Tab Take 500 mcg by mouth every day.     • zolpidem (AMBIEN) 10 MG Tab Take 10 mg by mouth at bedtime as needed for Sleep.     • insulin aspart (NOVOLOG) 100 UNIT/ML Solution Inject 16 Units as instructed BID AC(S).     • insulin detemir (LEVEMIR FLEXTOUCH) 100 UNIT/ML Solution Pen-injector injection Inject 70 Units as instructed every evening.     • Oxycodone-Acetaminophen (PERCOCET-10)  MG Tab Take 1 Tab by mouth every 6 hours as needed for Moderate Pain.     • alprazolam (XANAX) 0.25 MG Tab Take 0.25 mg by mouth 3 times a day as needed for Sleep or Anxiety. Indications: Feeling Anxious     • therapeutic multivitamin-minerals (THERAGRAN-M) Tab Take 1 Tab by mouth every day. 30 Tab 11   • gabapentin (NEURONTIN) 300 MG Cap Take 300 mg by mouth every day.     • metformin ER (GLUCOPHAGE XR) 500 MG TABLET SR 24 HR Take 500 mg by mouth every day.     • Cholecalciferol (VITAMIN D3) 5000 UNITS Cap Take 1 Cap by mouth every day.     • Omega-3 Fatty Acids (FISH OIL) 1000 MG Cap capsule Take 1,000 mg by mouth every day.     • lisinopril (PRINIVIL) 20 MG TABS Take 20 mg by mouth every day.     • clopidogrel (PLAVIX) 75 MG TABS Take 1 Tab by  "mouth every day. 30 Tab 6   • aspirin (ASA) 81 MG CHEW Take 162 mg by mouth every day.         ALLERGIES  Allergies   Allergen Reactions   • Augmentin Hives     pt states that she took this and bactrim together and got hives, but was able to take them one after another with no hives.   HZJ=[id number]   • Bactrim [Sulfamethoxazole W-Trimethoprim] Hives     USI=[id number]   • Erythromycin Hives     Unknown reaction from childhood - she thinks it was hives  RXN=>10 years ago   • Morphine Unspecified     \"restlessness and doesn't control pain\"  RXN=12/2/2015       PHYSICAL EXAM  VITAL SIGNS: /62 mmHg  Pulse 81  Temp(Src) 37 °C (98.6 °F)  Resp 18  Ht 1.626 m (5' 4\")  Wt 116.4 kg (256 lb 9.9 oz)  BMI 44.03 kg/m2  SpO2 93%    Constitutional: Well developed, Well nourished, No acute distress, Non-toxic appearance.    Abdomen: Bowel sounds normal, Soft, non tender, non distended,  No pulsatile masses., no rebound guarding or peritoneal signs.   Skin: Warm, Dry, No erythema, No rash,   Back:  No CVA tenderness,  No bony crepitance, step offs, or instability. Pain lateral Paraspinous muscles in lumbar spine, without bony tenderness, contusions, no numbness or weakness or cauda equina  Neurologic: Alert & oriented x 3, Normal motor function, Normal sensory function, No focal deficits noted. Normal reflexes. Normal Cranial Nerves.  Extremities: Equal, intact distal pulses, No cyanosis, clubbing or edema,  No tenderness. BKA right leg, left leg normal: patellar reflex  Musculoskeletal: Good range of motion in all major joints. No tenderness to palpation or major deformities noted.     COURSE & MEDICAL DECISION MAKING  Nursing notes, VS, PMSFHx reviewed in chart.    3:37 PM - Patient seen and examined at bedside. I discouraged an MRI to evaluate the patient's symptoms secondary to a negative X-ray. We discussed further plan of care including discharge, pain management, and follow up. The patient understands and verbalizes " agreement.     The patient will return for new or worsening symptoms and is stable at the time of discharge.    DISPOSITION:  Patient will be discharged home in stable condition.    FOLLOW UP:  Jimmy Song M.D.  [address]  [address]  [place] [address]  [phone]    Call in 2 days  for recheck, As needed, If symptoms worsen    [facility], Emergency Dept  [address]  [place] [address] [phone]    As needed, If symptoms worsen      OUTPATIENT MEDICATIONS:  Discharge Medication List as of 7/3/2017  4:12 PM      START taking these medications    Details   lidocaine (LIDODERM) 5 % Patch Apply 1 Patch to skin as directed every 24 hours., Disp-10 Patch, R-1, Normal      predniSONE (DELTASONE) 20 MG Tab Take 2 Tabs by mouth every day for 6 days., Disp-12 Tab, R-0, Normal           FINAL IMPRESSION  1. Fall, initial encounter    2. Acute midline low back pain without sciatica          Nasreen CORTEZ ([place]), am scribing for, and in the presence of, Deborah Cooper M.D..    Electronically signed by: Nasreen Lora ([place]), 7/3/2017    Deborah CORTEZ M.D. personally performed the services described in this documentation, as scribed by Nasreen Lora in my presence, and it is both accurate and complete.    The note accurately reflects work and decisions made by me.  Deborah Cooper  7/3/2017  7:10 PM

## 2017-07-25 ENCOUNTER — HOSPITAL ENCOUNTER (OUTPATIENT)
Dept: LAB | Facility: MEDICAL CENTER | Age: 57
End: 2017-07-25
Attending: PHYSICIAN ASSISTANT
Payer: COMMERCIAL

## 2017-07-25 PROCEDURE — 80053 COMPREHEN METABOLIC PANEL: CPT

## 2017-07-25 PROCEDURE — 36415 COLL VENOUS BLD VENIPUNCTURE: CPT

## 2017-07-25 PROCEDURE — 80061 LIPID PANEL: CPT

## 2017-07-26 LAB
ALBUMIN SERPL BCP-MCNC: 3.5 G/DL (ref 3.2–4.9)
ALBUMIN/GLOB SERPL: 1.2 G/DL
ALP SERPL-CCNC: 82 U/L (ref 30–99)
ALT SERPL-CCNC: 11 U/L (ref 2–50)
ANION GAP SERPL CALC-SCNC: 8 MMOL/L (ref 0–11.9)
AST SERPL-CCNC: 17 U/L (ref 12–45)
BILIRUB SERPL-MCNC: 0.5 MG/DL (ref 0.1–1.5)
BUN SERPL-MCNC: 39 MG/DL (ref 8–22)
CALCIUM SERPL-MCNC: 9.3 MG/DL (ref 8.5–10.5)
CHLORIDE SERPL-SCNC: 107 MMOL/L (ref 96–112)
CHOLEST SERPL-MCNC: 159 MG/DL (ref 100–199)
CO2 SERPL-SCNC: 28 MMOL/L (ref 20–33)
CREAT SERPL-MCNC: 1.3 MG/DL (ref 0.5–1.4)
GFR SERPL CREATININE-BSD FRML MDRD: 42 ML/MIN/1.73 M 2
GLOBULIN SER CALC-MCNC: 3 G/DL (ref 1.9–3.5)
GLUCOSE SERPL-MCNC: 36 MG/DL (ref 65–99)
HDLC SERPL-MCNC: 43 MG/DL
LDLC SERPL CALC-MCNC: 83 MG/DL
POTASSIUM SERPL-SCNC: 4.4 MMOL/L (ref 3.6–5.5)
PROT SERPL-MCNC: 6.5 G/DL (ref 6–8.2)
SODIUM SERPL-SCNC: 143 MMOL/L (ref 135–145)
TRIGL SERPL-MCNC: 165 MG/DL (ref 0–149)

## 2017-10-02 ENCOUNTER — HOSPITAL ENCOUNTER (OUTPATIENT)
Dept: RADIOLOGY | Facility: MEDICAL CENTER | Age: 57
End: 2017-10-02
Attending: SURGERY
Payer: COMMERCIAL

## 2017-10-02 DIAGNOSIS — I70.209 ATHEROSCLEROSIS OF ARTERIES OF EXTREMITIES (HCC): ICD-10-CM

## 2017-10-02 PROCEDURE — 93926 LOWER EXTREMITY STUDY: CPT

## 2017-10-02 PROCEDURE — 93922 UPR/L XTREMITY ART 2 LEVELS: CPT

## 2017-10-02 PROCEDURE — 93926 LOWER EXTREMITY STUDY: CPT | Mod: 26 | Performed by: SURGERY

## 2017-10-02 PROCEDURE — 93922 UPR/L XTREMITY ART 2 LEVELS: CPT | Mod: 26 | Performed by: SURGERY

## 2017-10-24 ENCOUNTER — HOSPITAL ENCOUNTER (OUTPATIENT)
Dept: LAB | Facility: MEDICAL CENTER | Age: 57
End: 2017-10-24
Attending: PHYSICIAN ASSISTANT
Payer: COMMERCIAL

## 2017-10-24 LAB
BASOPHILS # BLD AUTO: 1.6 % (ref 0–1.8)
BASOPHILS # BLD: 0.15 K/UL (ref 0–0.12)
CREAT UR-MCNC: 77.7 MG/DL
EOSINOPHIL # BLD AUTO: 0.38 K/UL (ref 0–0.51)
EOSINOPHIL NFR BLD: 4 % (ref 0–6.9)
ERYTHROCYTE [DISTWIDTH] IN BLOOD BY AUTOMATED COUNT: 47.6 FL (ref 35.9–50)
EST. AVERAGE GLUCOSE BLD GHB EST-MCNC: 134 MG/DL
HBA1C MFR BLD: 6.3 % (ref 0–5.6)
HCT VFR BLD AUTO: 47.8 % (ref 37–47)
HGB BLD-MCNC: 15.7 G/DL (ref 12–16)
IMM GRANULOCYTES # BLD AUTO: 0.06 K/UL (ref 0–0.11)
IMM GRANULOCYTES NFR BLD AUTO: 0.6 % (ref 0–0.9)
LYMPHOCYTES # BLD AUTO: 2.23 K/UL (ref 1–4.8)
LYMPHOCYTES NFR BLD: 23.4 % (ref 22–41)
MCH RBC QN AUTO: 28.2 PG (ref 27–33)
MCHC RBC AUTO-ENTMCNC: 32.8 G/DL (ref 33.6–35)
MCV RBC AUTO: 85.8 FL (ref 81.4–97.8)
MICROALBUMIN UR-MCNC: 21.4 MG/DL
MICROALBUMIN/CREAT UR: 275 MG/G (ref 0–30)
MONOCYTES # BLD AUTO: 0.77 K/UL (ref 0–0.85)
MONOCYTES NFR BLD AUTO: 8.1 % (ref 0–13.4)
NEUTROPHILS # BLD AUTO: 5.95 K/UL (ref 2–7.15)
NEUTROPHILS NFR BLD: 62.3 % (ref 44–72)
NRBC # BLD AUTO: 0 K/UL
NRBC BLD AUTO-RTO: 0 /100 WBC
PLATELET # BLD AUTO: 241 K/UL (ref 164–446)
PMV BLD AUTO: 11.5 FL (ref 9–12.9)
RBC # BLD AUTO: 5.57 M/UL (ref 4.2–5.4)
WBC # BLD AUTO: 9.5 K/UL (ref 4.8–10.8)

## 2017-10-24 PROCEDURE — 36415 COLL VENOUS BLD VENIPUNCTURE: CPT

## 2017-10-24 PROCEDURE — 82570 ASSAY OF URINE CREATININE: CPT

## 2017-10-24 PROCEDURE — 80053 COMPREHEN METABOLIC PANEL: CPT

## 2017-10-24 PROCEDURE — 80061 LIPID PANEL: CPT

## 2017-10-24 PROCEDURE — 83036 HEMOGLOBIN GLYCOSYLATED A1C: CPT

## 2017-10-24 PROCEDURE — 82043 UR ALBUMIN QUANTITATIVE: CPT

## 2017-10-24 PROCEDURE — 85025 COMPLETE CBC W/AUTO DIFF WBC: CPT

## 2017-10-25 LAB
ALBUMIN SERPL BCP-MCNC: 4.1 G/DL (ref 3.2–4.9)
ALBUMIN/GLOB SERPL: 1.5 G/DL
ALP SERPL-CCNC: 73 U/L (ref 30–99)
ALT SERPL-CCNC: 16 U/L (ref 2–50)
ANION GAP SERPL CALC-SCNC: 8 MMOL/L (ref 0–11.9)
AST SERPL-CCNC: 24 U/L (ref 12–45)
BILIRUB SERPL-MCNC: 0.5 MG/DL (ref 0.1–1.5)
BUN SERPL-MCNC: 34 MG/DL (ref 8–22)
CALCIUM SERPL-MCNC: 9.6 MG/DL (ref 8.5–10.5)
CHLORIDE SERPL-SCNC: 103 MMOL/L (ref 96–112)
CHOLEST SERPL-MCNC: 211 MG/DL (ref 100–199)
CO2 SERPL-SCNC: 30 MMOL/L (ref 20–33)
CREAT SERPL-MCNC: 1.19 MG/DL (ref 0.5–1.4)
GFR SERPL CREATININE-BSD FRML MDRD: 47 ML/MIN/1.73 M 2
GLOBULIN SER CALC-MCNC: 2.7 G/DL (ref 1.9–3.5)
GLUCOSE SERPL-MCNC: 100 MG/DL (ref 65–99)
HDLC SERPL-MCNC: 46 MG/DL
LDLC SERPL CALC-MCNC: 117 MG/DL
POTASSIUM SERPL-SCNC: 4.9 MMOL/L (ref 3.6–5.5)
PROT SERPL-MCNC: 6.8 G/DL (ref 6–8.2)
SODIUM SERPL-SCNC: 141 MMOL/L (ref 135–145)
TRIGL SERPL-MCNC: 238 MG/DL (ref 0–149)

## 2018-01-23 ENCOUNTER — HOSPITAL ENCOUNTER (OUTPATIENT)
Dept: LAB | Facility: MEDICAL CENTER | Age: 58
End: 2018-01-23
Attending: PHYSICIAN ASSISTANT
Payer: COMMERCIAL

## 2018-01-23 LAB
ALBUMIN SERPL BCP-MCNC: 4 G/DL (ref 3.2–4.9)
ALBUMIN/GLOB SERPL: 1.6 G/DL
ALP SERPL-CCNC: 58 U/L (ref 30–99)
ALT SERPL-CCNC: 20 U/L (ref 2–50)
ANION GAP SERPL CALC-SCNC: 7 MMOL/L (ref 0–11.9)
AST SERPL-CCNC: 24 U/L (ref 12–45)
BILIRUB SERPL-MCNC: 0.4 MG/DL (ref 0.1–1.5)
BUN SERPL-MCNC: 37 MG/DL (ref 8–22)
CALCIUM SERPL-MCNC: 9.1 MG/DL (ref 8.5–10.5)
CHLORIDE SERPL-SCNC: 104 MMOL/L (ref 96–112)
CHOLEST SERPL-MCNC: 244 MG/DL (ref 100–199)
CO2 SERPL-SCNC: 27 MMOL/L (ref 20–33)
CREAT SERPL-MCNC: 1.25 MG/DL (ref 0.5–1.4)
EST. AVERAGE GLUCOSE BLD GHB EST-MCNC: 137 MG/DL
GLOBULIN SER CALC-MCNC: 2.5 G/DL (ref 1.9–3.5)
GLUCOSE SERPL-MCNC: 123 MG/DL (ref 65–99)
HBA1C MFR BLD: 6.4 % (ref 0–5.6)
HDLC SERPL-MCNC: 37 MG/DL
LDLC SERPL CALC-MCNC: ABNORMAL MG/DL
POTASSIUM SERPL-SCNC: 4.4 MMOL/L (ref 3.6–5.5)
PROT SERPL-MCNC: 6.5 G/DL (ref 6–8.2)
SODIUM SERPL-SCNC: 138 MMOL/L (ref 135–145)
TRIGL SERPL-MCNC: 550 MG/DL (ref 0–149)

## 2018-01-23 PROCEDURE — 36415 COLL VENOUS BLD VENIPUNCTURE: CPT

## 2018-01-23 PROCEDURE — 80061 LIPID PANEL: CPT

## 2018-01-23 PROCEDURE — 80053 COMPREHEN METABOLIC PANEL: CPT

## 2018-01-23 PROCEDURE — 83036 HEMOGLOBIN GLYCOSYLATED A1C: CPT

## 2018-04-19 ENCOUNTER — HOSPITAL ENCOUNTER (OUTPATIENT)
Dept: LAB | Facility: MEDICAL CENTER | Age: 58
End: 2018-04-19
Attending: PHYSICIAN ASSISTANT
Payer: COMMERCIAL

## 2018-04-19 LAB
CHOLEST SERPL-MCNC: 209 MG/DL (ref 100–199)
CREAT UR-MCNC: 46.1 MG/DL
HDLC SERPL-MCNC: 38 MG/DL
LDLC SERPL CALC-MCNC: 95 MG/DL
MICROALBUMIN UR-MCNC: 6.6 MG/DL
MICROALBUMIN/CREAT UR: 143 MG/G (ref 0–30)
TRIGL SERPL-MCNC: 379 MG/DL (ref 0–149)

## 2018-04-19 PROCEDURE — 82570 ASSAY OF URINE CREATININE: CPT

## 2018-04-19 PROCEDURE — 80061 LIPID PANEL: CPT

## 2018-04-19 PROCEDURE — 36415 COLL VENOUS BLD VENIPUNCTURE: CPT

## 2018-04-19 PROCEDURE — 82043 UR ALBUMIN QUANTITATIVE: CPT

## 2018-07-09 ENCOUNTER — HOSPITAL ENCOUNTER (OUTPATIENT)
Dept: RADIOLOGY | Facility: MEDICAL CENTER | Age: 58
End: 2018-07-09
Attending: SURGERY
Payer: COMMERCIAL

## 2018-07-09 DIAGNOSIS — I70.90 MONCKEBERG'S MEDIAL SCLEROSIS: ICD-10-CM

## 2018-07-09 PROCEDURE — 93922 UPR/L XTREMITY ART 2 LEVELS: CPT

## 2018-07-09 PROCEDURE — 93926 LOWER EXTREMITY STUDY: CPT

## 2018-07-09 PROCEDURE — 93925 LOWER EXTREMITY STUDY: CPT | Mod: 26 | Performed by: SURGERY

## 2018-07-09 PROCEDURE — 93922 UPR/L XTREMITY ART 2 LEVELS: CPT | Mod: 26 | Performed by: SURGERY

## 2018-08-16 ENCOUNTER — HOSPITAL ENCOUNTER (OUTPATIENT)
Dept: LAB | Facility: MEDICAL CENTER | Age: 58
End: 2018-08-16
Attending: PHYSICIAN ASSISTANT
Payer: COMMERCIAL

## 2018-08-16 LAB
ALBUMIN SERPL BCP-MCNC: 3.8 G/DL (ref 3.2–4.9)
ALBUMIN/GLOB SERPL: 1.5 G/DL
ALP SERPL-CCNC: 61 U/L (ref 30–99)
ALT SERPL-CCNC: 17 U/L (ref 2–50)
ANION GAP SERPL CALC-SCNC: 7 MMOL/L (ref 0–11.9)
AST SERPL-CCNC: 25 U/L (ref 12–45)
BASOPHILS # BLD AUTO: 1.7 % (ref 0–1.8)
BASOPHILS # BLD: 0.14 K/UL (ref 0–0.12)
BILIRUB SERPL-MCNC: 0.5 MG/DL (ref 0.1–1.5)
BUN SERPL-MCNC: 34 MG/DL (ref 8–22)
CALCIUM SERPL-MCNC: 9.2 MG/DL (ref 8.5–10.5)
CHLORIDE SERPL-SCNC: 107 MMOL/L (ref 96–112)
CHOLEST SERPL-MCNC: 138 MG/DL (ref 100–199)
CO2 SERPL-SCNC: 28 MMOL/L (ref 20–33)
CREAT SERPL-MCNC: 1.4 MG/DL (ref 0.5–1.4)
EOSINOPHIL # BLD AUTO: 0.42 K/UL (ref 0–0.51)
EOSINOPHIL NFR BLD: 5 % (ref 0–6.9)
ERYTHROCYTE [DISTWIDTH] IN BLOOD BY AUTOMATED COUNT: 43.9 FL (ref 35.9–50)
EST. AVERAGE GLUCOSE BLD GHB EST-MCNC: 154 MG/DL
GLOBULIN SER CALC-MCNC: 2.6 G/DL (ref 1.9–3.5)
GLUCOSE SERPL-MCNC: 122 MG/DL (ref 65–99)
HBA1C MFR BLD: 7 % (ref 0–5.6)
HCT VFR BLD AUTO: 45.7 % (ref 37–47)
HDLC SERPL-MCNC: 45 MG/DL
HGB BLD-MCNC: 15.5 G/DL (ref 12–16)
IMM GRANULOCYTES # BLD AUTO: 0.02 K/UL (ref 0–0.11)
IMM GRANULOCYTES NFR BLD AUTO: 0.2 % (ref 0–0.9)
LDLC SERPL CALC-MCNC: 54 MG/DL
LYMPHOCYTES # BLD AUTO: 2.44 K/UL (ref 1–4.8)
LYMPHOCYTES NFR BLD: 29.2 % (ref 22–41)
MCH RBC QN AUTO: 30.4 PG (ref 27–33)
MCHC RBC AUTO-ENTMCNC: 33.9 G/DL (ref 33.6–35)
MCV RBC AUTO: 89.6 FL (ref 81.4–97.8)
MONOCYTES # BLD AUTO: 0.74 K/UL (ref 0–0.85)
MONOCYTES NFR BLD AUTO: 8.9 % (ref 0–13.4)
NEUTROPHILS # BLD AUTO: 4.6 K/UL (ref 2–7.15)
NEUTROPHILS NFR BLD: 55 % (ref 44–72)
NRBC # BLD AUTO: 0 K/UL
NRBC BLD-RTO: 0 /100 WBC
PLATELET # BLD AUTO: 210 K/UL (ref 164–446)
PMV BLD AUTO: 12.2 FL (ref 9–12.9)
POTASSIUM SERPL-SCNC: 4.5 MMOL/L (ref 3.6–5.5)
PROT SERPL-MCNC: 6.4 G/DL (ref 6–8.2)
RBC # BLD AUTO: 5.1 M/UL (ref 4.2–5.4)
SODIUM SERPL-SCNC: 142 MMOL/L (ref 135–145)
TRIGL SERPL-MCNC: 193 MG/DL (ref 0–149)
WBC # BLD AUTO: 8.4 K/UL (ref 4.8–10.8)

## 2018-08-16 PROCEDURE — 83036 HEMOGLOBIN GLYCOSYLATED A1C: CPT

## 2018-08-16 PROCEDURE — 82652 VIT D 1 25-DIHYDROXY: CPT

## 2018-08-16 PROCEDURE — 85025 COMPLETE CBC W/AUTO DIFF WBC: CPT

## 2018-08-16 PROCEDURE — 80053 COMPREHEN METABOLIC PANEL: CPT

## 2018-08-16 PROCEDURE — 36415 COLL VENOUS BLD VENIPUNCTURE: CPT

## 2018-08-16 PROCEDURE — 80061 LIPID PANEL: CPT

## 2018-08-18 LAB — 1,25(OH)2D3 SERPL-MCNC: 24.3 PG/ML (ref 19.9–79.3)

## 2018-08-20 ENCOUNTER — OCCUPATIONAL THERAPY (OUTPATIENT)
Dept: OCCUPATIONAL THERAPY | Facility: REHABILITATION | Age: 58
End: 2018-08-20
Attending: FAMILY MEDICINE
Payer: COMMERCIAL

## 2018-08-20 DIAGNOSIS — G54.6 PHANTOM LIMB SYNDROME WITH PAIN (HCC): ICD-10-CM

## 2018-08-20 PROCEDURE — 97750 PHYSICAL PERFORMANCE TEST: CPT

## 2018-08-20 ASSESSMENT — BALANCE ASSESSMENTS
BALANCE - SITTING DYNAMIC: FAIR +
BALANCE - SITTING STATIC: FAIR +
BALANCE - STANDING DYNAMIC: GOOD
BALANCE - STANDING STATIC: GOOD
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
## 2019-09-24 ENCOUNTER — HOSPITAL ENCOUNTER (OUTPATIENT)
Dept: RADIOLOGY | Facility: MEDICAL CENTER | Age: 59
End: 2019-09-24
Attending: SURGERY
Payer: COMMERCIAL

## 2019-09-24 DIAGNOSIS — I15.9 SECONDARY HYPERTENSION: ICD-10-CM

## 2019-09-24 DIAGNOSIS — E11.9 DIABETES MELLITUS WITHOUT COMPLICATION (HCC): ICD-10-CM

## 2019-09-24 DIAGNOSIS — K58.9 IRRITABLE BOWEL SYNDROME, UNSPECIFIED TYPE: ICD-10-CM

## 2019-09-24 DIAGNOSIS — I70.209 PERIPHERAL ARTERIOSCLEROSIS (HCC): ICD-10-CM

## 2019-09-24 PROCEDURE — 93922 UPR/L XTREMITY ART 2 LEVELS: CPT | Mod: 52,LT

## 2019-09-24 PROCEDURE — 93926 LOWER EXTREMITY STUDY: CPT | Mod: LT

## 2019-09-25 PROCEDURE — 93926 LOWER EXTREMITY STUDY: CPT | Mod: 26 | Performed by: INTERNAL MEDICINE

## 2019-09-25 PROCEDURE — 93922 UPR/L XTREMITY ART 2 LEVELS: CPT | Mod: 26,52 | Performed by: INTERNAL MEDICINE

## 2020-01-13 ENCOUNTER — OFFICE VISIT (OUTPATIENT)
Dept: ENDOCRINOLOGY | Facility: MEDICAL CENTER | Age: 60
End: 2020-01-13
Payer: COMMERCIAL

## 2020-01-13 VITALS
WEIGHT: 260 LBS | HEART RATE: 82 BPM | SYSTOLIC BLOOD PRESSURE: 136 MMHG | HEIGHT: 64 IN | OXYGEN SATURATION: 97 % | DIASTOLIC BLOOD PRESSURE: 70 MMHG | BODY MASS INDEX: 44.39 KG/M2

## 2020-01-13 DIAGNOSIS — I10 HTN (HYPERTENSION), BENIGN: ICD-10-CM

## 2020-01-13 DIAGNOSIS — Z79.4 ENCOUNTER FOR LONG-TERM (CURRENT) USE OF INSULIN (HCC): ICD-10-CM

## 2020-01-13 DIAGNOSIS — E11.00 TYPE 2 DIABETES MELLITUS WITH HYPEROSMOLARITY WITHOUT COMA, UNSPECIFIED WHETHER LONG TERM INSULIN USE (HCC): ICD-10-CM

## 2020-01-13 LAB
HBA1C MFR BLD: 7.9 % (ref 0–5.6)
INT CON NEG: NEGATIVE
INT CON POS: POSITIVE

## 2020-01-13 PROCEDURE — 83036 HEMOGLOBIN GLYCOSYLATED A1C: CPT | Performed by: PHYSICIAN ASSISTANT

## 2020-01-13 PROCEDURE — 99204 OFFICE O/P NEW MOD 45 MIN: CPT | Performed by: PHYSICIAN ASSISTANT

## 2020-01-13 RX ORDER — PIOGLITAZONEHYDROCHLORIDE 30 MG/1
30 TABLET ORAL DAILY
Qty: 30 TAB | Refills: 11 | Status: SHIPPED | OUTPATIENT
Start: 2020-01-13

## 2020-01-13 NOTE — PROGRESS NOTES
Return to office Patient Consult Note  Referred by: Jimmy Song M.D.    Reason for consult: Diabetes Management Type 2    HPI:  Suly Rodríguez is a 59 y.o. old patient who is seeing us today for diabetes care.  This is a pleasant patient with diabetes and I appreciate the opportunity to participate in the care of this patient.    Labs of  6/25/19  HbA1c is 7.3, GFR 53, HDL 42, LDL51  Labs of 8/16/18 HbA1c is 7.0, GFR 39    BG Diary:1/13/2020  In the AM:  No log    Right leg amputation    1. Type 2 diabetes mellitus with hyperosmolarity without coma, unspecified whether long term insulin use (HCC)  This is a new patient on 1/13/20   They are on:  1.  Metformin 500mg once a day  2.  Tresiba 80 units at night  3.  Novolog 15 units breakfast and dinner    START:  1.  Synjardy 10/25551 one a day  2.  Start with Ozempic 0.25 once a week for 3 weeks then INCREASE to 0.5 for three weeks then increase to 1.0 (if you have a sample pen 0.5 +0.5 so take two injections this will equal 1.0). Ozempic can cause nausea and upset stomach feelings but this generally only lasts a few days.  If this persists longer than 2 weeks and is not tolerable please discontinue the medication and let us know of the issue.  3.  Actos 30mg a day (no HX CHF)  4.  Tresiba 60 at night  5.  Novolog 8 with meals.       2. HTN (hypertension), benign  This is stable today and no new changes are needed or required in today's visit      ROS:   Constitutional: No night sweats.  Eyes:  No visual changes.  Cardiac: No chest pain, No palpitations or racing heart rate.  Resp: No shortness of breath, No cough,   Gi: No Diarrhea    All other systems were reviewed and were/are negative.      Past Medical History:  Patient Active Problem List    Diagnosis Date Noted   • Foot infection 11/28/2015     Priority: High   • Septic shock ([facility]) 11/27/2015     Priority: High   • DM (diabetes mellitus) ([facility]) 11/28/2015     Priority: Medium   • ARF (acute renal failure)  ([facility]) 11/28/2015     Priority: Medium   • Anemia 11/28/2015     Priority: Medium   • Cellulitis 11/27/2015     Priority: Medium   • PAD (peripheral artery disease) ([facility]) 11/28/2015     Priority: Low   • Encounter for long-term (current) use of insulin ([facility]) 01/13/2020   • Age-related nuclear cataract of both eyes 08/10/2016   • Peripheral arterial disease ([facility]) 02/05/2016   • Hypoglycemia 12/27/2015   • Infection of below knee amputation stump ([facility]) 12/16/2015   • Peripheral neuropathy 12/01/2015   • Diabetic ulcer of right great toe ([facility]) 11/17/2015   • Atherosclerosis of native artery of extremity ([facility]) 06/25/2014   • Gangrene ([facility]) 02/24/2014   • Peripheral vascular disease ([facility]) 02/03/2014   • HTN (hypertension), benign 08/05/2013   • Insomnia 08/05/2013   • Depression 08/05/2013   • Type II diabetes mellitus ([facility]) 08/05/2013   • Chronic diarrhea 08/05/2013   • Multiple sclerosis ([facility]) 08/05/2013   • Malignant hypertension 07/27/2013       Past Surgical History:  Past Surgical History:   Procedure Laterality Date   • CATARACT PHACO WITH IOL Left 8/10/2016    Procedure: CATARACT PHACO WITH IOL;  Surgeon: Maria T Saini M.D.;  Location: SURGERY SAME DAY [facility];  Service:    • DEBRIDEMENT Right 2/5/2016    Procedure: DEBRIDEMENT of below knee amputation stump and wound VAC placement;  Surgeon: Trenton Alford M.D.;  Location: SURGERY [facility];  Service:    • IRRIGATION & DEBRIDEMENT GENERAL Right 12/16/2015    Procedure: IRRIGATION & DEBRIDEMENT GENERAL BELOW THE KNEE AMPUTATION;  Surgeon: Trenton Alford M.D.;  Location: SURGERY [facility];  Service:    • KNEE AMPUTATION BELOW Right 12/2/2015    Procedure: KNEE AMPUTATION BELOW;  Surgeon: Trenton Alford M.D.;  Location: SURGERY [facility];  Service:    • RECOVERY  5/20/2015    Procedure: VASCULAR-ALFORD-ABDOMINAL AORTOGRAM WITH RUNOFF POSSIBLE INTERVENTION-61247 ATHEROSCLEROSIS 440.20;  Surgeon: Recoveryonyamila Surgery;  Location: SURGERY PRE-POST  PROC UNIT [facility];  Service:    • RECOVERY  6/25/2014    Performed by [facility] at SURGERY SAME DAY [facility]   • IRRIGATION & DEBRIDEMENT GENERAL  2/24/2014    Performed by Otilio Toro M.D. at SURGERY [facility]   • TOE AMPUTATION  2/24/2014    Performed by Otilio Toro M.D. at SURGERY [facility]   • TOE AMPUTATION  2/7/2014    Performed by Otilio Toro M.D. at SURGERY [facility]   • FEMORAL TIBIAL BYPASS  2/3/2014    Performed by Otilio Toro M.D. at SURGERY [facility]   • RECOVERY  1/10/2014    Performed by [facility] at SURGERY [facility]   • OTHER ORTHOPEDIC SURGERY  2007    R shoulder surgery       Allergies:  Augmentin; Bactrim [sulfamethoxazole w-trimethoprim]; Erythromycin; and Morphine    Social History:  Social History     Socioeconomic History   • Marital status:      Spouse name: Not on file   • Number of children: Not on file   • Years of education: Not on file   • Highest education level: Not on file   Occupational History   • Not on file   Social Needs   • Financial resource strain: Not on file   • Food insecurity:     Worry: Not on file     Inability: Not on file   • Transportation needs:     Medical: Not on file     Non-medical: Not on file   Tobacco Use   • Smoking status: Never Smoker   • Smokeless tobacco: Never Used   Substance and Sexual Activity   • Alcohol use: Yes     Comment: 1 per week   • Drug use: No   • Sexual activity: Not on file   Lifestyle   • Physical activity:     Days per week: Not on file     Minutes per session: Not on file   • Stress: Not on file   Relationships   • Social connections:     Talks on phone: Not on file     Gets together: Not on file     Attends Hindu service: Not on file     Active member of club or organization: Not on file     Attends meetings of clubs or organizations: Not on file     Relationship status: Not on file   • Intimate partner violence:     Fear of current or ex partner: Not on file      Emotionally abused: Not on file     Physically abused: Not on file     Forced sexual activity: Not on file   Other Topics Concern   • Not on file   Social History Narrative   • Not on file       Family History:  History reviewed. No pertinent family history.    Medications:    Current Outpatient Medications:   •  pioglitazone (ACTOS) 30 MG Tab, Take 1 Tab by mouth every day., Disp: 30 Tab, Rfl: 11  •  lidocaine (LIDODERM) 5 % Patch, Apply 1 Patch to skin as directed every 24 hours., Disp: 10 Patch, Rfl: 1  •  clindamycin (CLEOCIN) 300 MG Cap, Take 1 Cap by mouth every 8 hours., Disp: 21 Cap, Rfl: 0  •  escitalopram (LEXAPRO) 20 MG tablet, Take 20 mg by mouth every day., Disp: , Rfl:   •  amlodipine (NORVASC) 10 MG Tab, Take 5 mg by mouth every day. Indications: High Blood Pressure, Disp: , Rfl:   •  furosemide (LASIX) 20 MG Tab, Take 20 mg by mouth every day., Disp: , Rfl:   •  cyanocobalamin (VITAMIN B-12) 500 MCG Tab, Take 500 mcg by mouth every day., Disp: , Rfl:   •  zolpidem (AMBIEN) 10 MG Tab, Take 10 mg by mouth at bedtime as needed for Sleep., Disp: , Rfl:   •  insulin aspart (NOVOLOG) 100 UNIT/ML Solution, Inject 16 Units as instructed BID AC(S)., Disp: , Rfl:   •  Oxycodone-Acetaminophen (PERCOCET-10)  MG Tab, Take 1 Tab by mouth every 6 hours as needed for Moderate Pain., Disp: , Rfl:   •  alprazolam (XANAX) 0.25 MG Tab, Take 0.25 mg by mouth 3 times a day as needed for Sleep or Anxiety. Indications: Feeling Anxious, Disp: , Rfl:   •  therapeutic multivitamin-minerals (THERAGRAN-M) Tab, Take 1 Tab by mouth every day., Disp: 30 Tab, Rfl: 11  •  gabapentin (NEURONTIN) 300 MG Cap, Take 300 mg by mouth every day., Disp: , Rfl:   •  metformin ER (GLUCOPHAGE XR) 500 MG TABLET SR 24 HR, Take 500 mg by mouth every day., Disp: , Rfl:   •  Cholecalciferol (VITAMIN D3) 5000 UNITS Cap, Take 1 Cap by mouth every day., Disp: , Rfl:   •  Omega-3 Fatty Acids (FISH OIL) 1000 MG Cap capsule, Take 1,000 mg by mouth  "every day., Disp: , Rfl:   •  lisinopril (PRINIVIL) 20 MG TABS, Take 20 mg by mouth every day., Disp: , Rfl:   •  clopidogrel (PLAVIX) 75 MG TABS, Take 1 Tab by mouth every day., Disp: 30 Tab, Rfl: 6  •  aspirin (ASA) 81 MG CHEW, Take 162 mg by mouth every day., Disp: , Rfl:         Physical Examination:   Vital signs: /70 (BP Location: Left arm, Patient Position: Sitting)   Pulse 82   Ht 1.626 m (5' 4\")   Wt 117.9 kg (260 lb)   SpO2 97%   BMI 44.63 kg/m²   General: No distress, cooperative, well dressed and well nourished.   Eyes: No scleral icterus or discharge, No hyposphagma  ENMT: Normal on external inspection of nose, lips, No nasal drainage   Neck: No abnormal masses on inspection  Resp: Normal effort, Bilateral clear to auscultation, No wheezing, No rales  CVS: Regular rate and rhythm, S1 S2 normal, No murmur. No gallop  Extremities: No edema bilateral extremities  Neuro: Alert and oriented  Skin: No rash, No Ulcers  Psych: Normal mood and affect      Assessment and Plan:    1. Type 2 diabetes mellitus with hyperosmolarity without coma, unspecified whether long term insulin use (HCC)  They are on:  1.  Metformin 500mg once a day  2.  Tresiba 80 units at night  3.  Novolog 15 units breakfast and dinner    START:  1.  Synjardy 10/1000 one a day  2.  Start with Ozempic 0.25 once a week for 3 weeks then INCREASE to 0.5 for three weeks then increase to 1.0 (if you have a sample pen 0.5 +0.5 so take two injections this will equal 1.0). Ozempic can cause nausea and upset stomach feelings but this generally only lasts a few days.  If this persists longer than 2 weeks and is not tolerable please discontinue the medication and let us know of the issue.  3.  Actos 30mg a day (no HX CHF)  4.  Tresiba 60 at night  5.  Novolog 8 with meals.     2. HTN (hypertension), benign  This is stable today and no new changes are needed or required in today's visit      Return in about 1 month (around 2/13/2020).      This " patient during there office visit today was started on a new medication Ozempic, synjardy.  Side effects of the new medication were discussed with the patient today in the office.       Thank you kindly for allowing me to participate in the diabetes care plan for this patient.    Fabian Lamar PA-C, BC-[facility]  Board Certified - Advanced Diabetes Management  01/13/20    CC:   Jimmy Song M.D.

## 2020-01-13 NOTE — PATIENT INSTRUCTIONS
Blood glucose log: Check BG in the morning when wake up,  and before bed.  So two times a day.  Always bring BG diary to the next office visit.     They are on:  1.  Metformin 500mg once a day  2.  Tresiba 80 units at night  3.  Novolog 15 units breakfast and dinner    START:  1.  Synjardy 10/1000 one a day  2.  Start with Ozempic 0.25 once a week for 3 weeks then INCREASE to 0.5 for three weeks then increase to 1.0 (if you have a sample pen 0.5 +0.5 so take two injections this will equal 1.0). Ozempic can cause nausea and upset stomach feelings but this generally only lasts a few days.  If this persists longer than 2 weeks and is not tolerable please discontinue the medication and let us know of the issue.  3.  Actos 30mg a day (no HX CHF)  4.  Tresiba 60 at night  5.  Novolog 8 with meals.

## 2020-02-13 ENCOUNTER — OFFICE VISIT (OUTPATIENT)
Dept: ENDOCRINOLOGY | Facility: MEDICAL CENTER | Age: 60
End: 2020-02-13
Payer: COMMERCIAL

## 2020-02-13 VITALS
HEIGHT: 64 IN | DIASTOLIC BLOOD PRESSURE: 68 MMHG | OXYGEN SATURATION: 93 % | BODY MASS INDEX: 42.68 KG/M2 | HEART RATE: 71 BPM | SYSTOLIC BLOOD PRESSURE: 110 MMHG | WEIGHT: 250 LBS

## 2020-02-13 DIAGNOSIS — E11.00 TYPE 2 DIABETES MELLITUS WITH HYPEROSMOLARITY WITHOUT COMA, UNSPECIFIED WHETHER LONG TERM INSULIN USE (HCC): ICD-10-CM

## 2020-02-13 DIAGNOSIS — I10 HTN (HYPERTENSION), BENIGN: ICD-10-CM

## 2020-02-13 PROCEDURE — 99214 OFFICE O/P EST MOD 30 MIN: CPT | Performed by: PHYSICIAN ASSISTANT

## 2020-02-13 NOTE — PATIENT INSTRUCTIONS
Now on:  1.  Synjardy 10/10000 one a day  2.  Ozempic 0.5 once a week  (0.2 another two times then increase to 1.0)  3.  Actos 30mg a day (no HX CHF)  4.  Tresiba 60 at night  5.  Novolog 8 with meals.  (decrease to 4 when you get to 1.0 of Ozempic stop the Novolog)

## 2020-02-13 NOTE — PROGRESS NOTES
Return to office Patient Consult Note  Referred by: Jimmy Song M.D.    Reason for consult: Diabetes Management Type 2    HPI:  Suly Rodríguez is a 59 y.o. old patient who is seeing us today for diabetes care.  This is a pleasant patient with diabetes and I appreciate the opportunity to participate in the care of this patient.    Labs of 1/13/2020 HbA1c is 7.9  Labs of  6/25/19  HbA1c is 7.3, GFR 53, HDL 42, LDL51  Labs of 8/16/18 HbA1c is 7.0, GFR 39    BG Diary:2/13/2020  In the AM:  No log    Right leg amputation    Lost 10 pounds since last visit.       1. Type 2 diabetes mellitus with hyperosmolarity without coma, unspecified whether long term insulin use ([facility])  This is a new patient on 1/13/20   They are on:  1.  Metformin 500mg once a day  2.  Tresiba 80 units at night  3.  Novolog 15 units breakfast and dinner     Now on:  1.  Synjardy 10/73804 one a day  2.  Start with Ozempic 0.5 once a week   3.  Actos 30mg a day (no HX CHF)  4.  Tresiba 60 at night  5.  Novolog 8 with meals.         2. HTN (hypertension), benign  This is stable today and no new changes are needed or required in today's visit      ROS:   Constitutional: No night sweats.  Eyes:  No visual changes.  Cardiac: No chest pain, No palpitations or racing heart rate.  Resp: No shortness of breath, No cough,   Gi: No Diarrhea    All other systems were reviewed and were/are negative.      Past Medical History:  Patient Active Problem List    Diagnosis Date Noted   • Foot infection 11/28/2015     Priority: High   • Septic shock ([facility]) 11/27/2015     Priority: High   • DM (diabetes mellitus) ([facility]) 11/28/2015     Priority: Medium   • ARF (acute renal failure) ([facility]) 11/28/2015     Priority: Medium   • Anemia 11/28/2015     Priority: Medium   • Cellulitis 11/27/2015     Priority: Medium   • PAD (peripheral artery disease) ([facility]) 11/28/2015     Priority: Low   • Encounter for long-term (current) use of insulin ([facility]) 01/13/2020   • Age-related  nuclear cataract of both eyes 08/10/2016   • Peripheral arterial disease (HCC) 02/05/2016   • Hypoglycemia 12/27/2015   • Infection of below knee amputation stump ([facility]) 12/16/2015   • Peripheral neuropathy 12/01/2015   • Diabetic ulcer of right great toe ([facility]) 11/17/2015   • Atherosclerosis of native artery of extremity ([facility]) 06/25/2014   • Gangrene ([facility]) 02/24/2014   • Peripheral vascular disease ([facility]) 02/03/2014   • HTN (hypertension), benign 08/05/2013   • Insomnia 08/05/2013   • Depression 08/05/2013   • Type II diabetes mellitus ([facility]) 08/05/2013   • Chronic diarrhea 08/05/2013   • Multiple sclerosis ([facility]) 08/05/2013   • Malignant hypertension 07/27/2013       Past Surgical History:  Past Surgical History:   Procedure Laterality Date   • CATARACT PHACO WITH IOL Left 8/10/2016    Procedure: CATARACT PHACO WITH IOL;  Surgeon: Maria T Saini M.D.;  Location: SURGERY SAME DAY [facility];  Service:    • DEBRIDEMENT Right 2/5/2016    Procedure: DEBRIDEMENT of below knee amputation stump and wound VAC placement;  Surgeon: Trenton Alford M.D.;  Location: SURGERY [facility];  Service:    • IRRIGATION & DEBRIDEMENT GENERAL Right 12/16/2015    Procedure: IRRIGATION & DEBRIDEMENT GENERAL BELOW THE KNEE AMPUTATION;  Surgeon: Trenton Alford M.D.;  Location: SURGERY [facility];  Service:    • KNEE AMPUTATION BELOW Right 12/2/2015    Procedure: KNEE AMPUTATION BELOW;  Surgeon: Trenton Alford M.D.;  Location: SURGERY [facility];  Service:    • RECOVERY  5/20/2015    Procedure: VASCULAR-ALFORD-ABDOMINAL AORTOGRAM WITH RUNOFF POSSIBLE INTERVENTION-44941 ATHEROSCLEROSIS 440.20;  Surgeon: Recoveryonyamila Surgery;  Location: SURGERY PRE-POST PROC UNIT [facility];  Service:    • RECOVERY  6/25/2014    Performed by [facility] at SURGERY SAME DAY [facility]   • IRRIGATION & DEBRIDEMENT GENERAL  2/24/2014    Performed by Otilio Toro M.D. at [facility]   • TOE AMPUTATION  2/24/2014    Performed by Otilio  ISMAEL Toro at SURGERY [facility]   • TOE AMPUTATION  2/7/2014    Performed by Otilio Toro M.D. at SURGERY [facility]   • FEMORAL TIBIAL BYPASS  2/3/2014    Performed by Otilio Toro M.D. at SURGERY [facility]   • RECOVERY  1/10/2014    Performed by [facility] at SURGERY [facility]   • OTHER ORTHOPEDIC SURGERY  2007    R shoulder surgery       Allergies:  Augmentin; Bactrim [sulfamethoxazole w-trimethoprim]; Erythromycin; and Morphine    Social History:  Social History     Socioeconomic History   • Marital status:      Spouse name: Not on file   • Number of children: Not on file   • Years of education: Not on file   • Highest education level: Not on file   Occupational History   • Not on file   Social Needs   • Financial resource strain: Not on file   • Food insecurity     Worry: Not on file     Inability: Not on file   • Transportation needs     Medical: Not on file     Non-medical: Not on file   Tobacco Use   • Smoking status: Never Smoker   • Smokeless tobacco: Never Used   Substance and Sexual Activity   • Alcohol use: Yes     Comment: 1 per week   • Drug use: No   • Sexual activity: Not on file   Lifestyle   • Physical activity     Days per week: Not on file     Minutes per session: Not on file   • Stress: Not on file   Relationships   • Social connections     Talks on phone: Not on file     Gets together: Not on file     Attends Sikhism service: Not on file     Active member of club or organization: Not on file     Attends meetings of clubs or organizations: Not on file     Relationship status: Not on file   • Intimate partner violence     Fear of current or ex partner: Not on file     Emotionally abused: Not on file     Physically abused: Not on file     Forced sexual activity: Not on file   Other Topics Concern   • Not on file   Social History Narrative   • Not on file       Family History:  History reviewed. No pertinent family history.    Medications:    Current  Outpatient Medications:   •  pioglitazone (ACTOS) 30 MG Tab, Take 1 Tab by mouth every day., Disp: 30 Tab, Rfl: 11  •  lidocaine (LIDODERM) 5 % Patch, Apply 1 Patch to skin as directed every 24 hours., Disp: 10 Patch, Rfl: 1  •  clindamycin (CLEOCIN) 300 MG Cap, Take 1 Cap by mouth every 8 hours., Disp: 21 Cap, Rfl: 0  •  escitalopram (LEXAPRO) 20 MG tablet, Take 20 mg by mouth every day., Disp: , Rfl:   •  amlodipine (NORVASC) 10 MG Tab, Take 5 mg by mouth every day. Indications: High Blood Pressure, Disp: , Rfl:   •  furosemide (LASIX) 20 MG Tab, Take 20 mg by mouth every day., Disp: , Rfl:   •  cyanocobalamin (VITAMIN B-12) 500 MCG Tab, Take 500 mcg by mouth every day., Disp: , Rfl:   •  zolpidem (AMBIEN) 10 MG Tab, Take 10 mg by mouth at bedtime as needed for Sleep., Disp: , Rfl:   •  insulin aspart (NOVOLOG) 100 UNIT/ML Solution, Inject 16 Units as instructed BID AC(S)., Disp: , Rfl:   •  Oxycodone-Acetaminophen (PERCOCET-10)  MG Tab, Take 1 Tab by mouth every 6 hours as needed for Moderate Pain., Disp: , Rfl:   •  alprazolam (XANAX) 0.25 MG Tab, Take 0.25 mg by mouth 3 times a day as needed for Sleep or Anxiety. Indications: Feeling Anxious, Disp: , Rfl:   •  therapeutic multivitamin-minerals (THERAGRAN-M) Tab, Take 1 Tab by mouth every day., Disp: 30 Tab, Rfl: 11  •  gabapentin (NEURONTIN) 300 MG Cap, Take 300 mg by mouth every day., Disp: , Rfl:   •  metformin ER (GLUCOPHAGE XR) 500 MG TABLET SR 24 HR, Take 500 mg by mouth every day., Disp: , Rfl:   •  Cholecalciferol (VITAMIN D3) 5000 UNITS Cap, Take 1 Cap by mouth every day., Disp: , Rfl:   •  Omega-3 Fatty Acids (FISH OIL) 1000 MG Cap capsule, Take 1,000 mg by mouth every day., Disp: , Rfl:   •  lisinopril (PRINIVIL) 20 MG TABS, Take 20 mg by mouth every day., Disp: , Rfl:   •  clopidogrel (PLAVIX) 75 MG TABS, Take 1 Tab by mouth every day., Disp: 30 Tab, Rfl: 6  •  aspirin (ASA) 81 MG CHEW, Take 162 mg by mouth every day., Disp: , Rfl:  "        Physical Examination:   Vital signs: /68 (BP Location: Left arm, Patient Position: Sitting)   Pulse 71   Ht 1.626 m (5' 4\")   Wt 113.4 kg (250 lb)   SpO2 93%   BMI 42.91 kg/m²   General: No distress, cooperative, well dressed and well nourished.   Eyes: No scleral icterus or discharge, No hyposphagma  ENMT: Normal on external inspection of nose, lips, No nasal drainage   Neck: No abnormal masses on inspection  Resp: Normal effort, Bilateral clear to auscultation, No wheezing, No rales  CVS: Regular rate and rhythm, S1 S2 normal, No murmur. No gallop  Extremities: No edema bilateral extremities  Neuro: Alert and oriented  Skin: No rash, No Ulcers  Psych: Normal mood and affect      Assessment and Plan:    1. Type 2 diabetes mellitus with hyperosmolarity without coma, unspecified whether long term insulin use (HCC)    Now on:  1.  Synjardy 10/17386 one a day  2.  Ozempic 0.5 once a week  (0.2 another two times then increase to 1.0)  3.  Actos 30mg a day (no HX CHF)  4.  Tresiba 60 at night  5.  Novolog 8 with meals.  (decrease to 4 when you get to 1.0 of Ozempic stop the Novolog)    2. HTN (hypertension), benign  This is stable today and no new changes are needed or required in today's visit    Return in about 2 months (around 4/13/2020).      Thank you kindly for allowing me to participate in the diabetes care plan for this patient.    Fabian Lamar PA-C, BC-ADM  Board Certified - Advanced Diabetes Management  02/13/20    CC:   Jimmy Song M.D.    "

## 2020-09-28 ENCOUNTER — HOSPITAL ENCOUNTER (OUTPATIENT)
Dept: RADIOLOGY | Facility: MEDICAL CENTER | Age: 60
End: 2020-09-28
Attending: SURGERY
Payer: COMMERCIAL

## 2020-09-28 DIAGNOSIS — I70.209 ATHEROSCLEROSIS OF ARTERIES OF EXTREMITIES (HCC): ICD-10-CM

## 2020-09-28 PROCEDURE — 93922 UPR/L XTREMITY ART 2 LEVELS: CPT | Mod: 52,LT

## 2020-09-28 PROCEDURE — 93926 LOWER EXTREMITY STUDY: CPT | Mod: LT

## 2020-10-06 ENCOUNTER — APPOINTMENT (OUTPATIENT)
Dept: RADIOLOGY | Facility: MEDICAL CENTER | Age: 60
End: 2020-10-06
Attending: FAMILY MEDICINE
Payer: COMMERCIAL

## 2020-10-06 DIAGNOSIS — Z12.31 VISIT FOR SCREENING MAMMOGRAM: ICD-10-CM

## 2020-11-17 ENCOUNTER — HOSPITAL ENCOUNTER (OUTPATIENT)
Dept: HOSPITAL 8 - RAD | Age: 60
Discharge: HOME | End: 2020-11-17
Attending: NURSE PRACTITIONER
Payer: COMMERCIAL

## 2020-11-17 DIAGNOSIS — Z88.1: ICD-10-CM

## 2020-11-17 DIAGNOSIS — Z89.422: ICD-10-CM
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
## 2020-11-17 DIAGNOSIS — Z82.49: ICD-10-CM

## 2020-11-17 DIAGNOSIS — S22.078A: Primary | ICD-10-CM

## 2020-11-17 DIAGNOSIS — Z79.899: ICD-10-CM

## 2020-11-17 DIAGNOSIS — E11.9: ICD-10-CM

## 2020-11-17 DIAGNOSIS — Z89.511: ICD-10-CM

## 2020-11-17 DIAGNOSIS — I10: ICD-10-CM

## 2020-11-17 DIAGNOSIS — Z79.82: ICD-10-CM

## 2020-11-17 DIAGNOSIS — M51.36: ICD-10-CM

## 2020-11-17 DIAGNOSIS — Z79.4: ICD-10-CM
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
## 2020-11-17 DIAGNOSIS — M43.16: ICD-10-CM

## 2020-11-17 DIAGNOSIS — Z88.5: ICD-10-CM

## 2020-11-17 DIAGNOSIS — Z88.8: ICD-10-CM

## 2020-11-17 PROCEDURE — 72146 MRI CHEST SPINE W/O DYE: CPT

## 2020-11-17 PROCEDURE — 99156 MOD SED OTH PHYS/QHP 5/>YRS: CPT

## 2020-11-17 PROCEDURE — 99157 MOD SED OTHER PHYS/QHP EA: CPT

## 2020-11-17 PROCEDURE — 72148 MRI LUMBAR SPINE W/O DYE: CPT

## 2021-03-15 DIAGNOSIS — Z23 NEED FOR VACCINATION: ICD-10-CM

## 2021-04-07 ENCOUNTER — OFFICE VISIT (OUTPATIENT)
Dept: PHYSICAL MEDICINE AND REHAB | Facility: REHABILITATION | Age: 61
End: 2021-04-07
Payer: COMMERCIAL

## 2021-04-07 VITALS
SYSTOLIC BLOOD PRESSURE: 128 MMHG | WEIGHT: 240 LBS | RESPIRATION RATE: 18 BRPM | BODY MASS INDEX: 40.97 KG/M2 | OXYGEN SATURATION: 97 % | HEART RATE: 70 BPM | DIASTOLIC BLOOD PRESSURE: 78 MMHG | HEIGHT: 64 IN | TEMPERATURE: 97.4 F

## 2021-04-07 DIAGNOSIS — Z89.511 HX OF RIGHT BKA (HCC): Primary | ICD-10-CM

## 2021-04-07 DIAGNOSIS — M79.2 NEUROPATHIC PAIN: ICD-10-CM

## 2021-04-07 DIAGNOSIS — R45.89 DEPRESSED MOOD: ICD-10-CM

## 2021-04-07 PROCEDURE — 99204 OFFICE O/P NEW MOD 45 MIN: CPT | Performed by: PHYSICAL MEDICINE & REHABILITATION

## 2021-04-07 RX ORDER — BENAZEPRIL HYDROCHLORIDE 20 MG/1
40 TABLET ORAL DAILY
COMMUNITY

## 2021-04-07 RX ORDER — METOPROLOL SUCCINATE 25 MG/1
25 TABLET, EXTENDED RELEASE ORAL DAILY
COMMUNITY

## 2021-04-07 RX ORDER — NORTRIPTYLINE HYDROCHLORIDE 25 MG/1
25 CAPSULE ORAL
Qty: 30 CAPSULE | Refills: 0 | Status: SHIPPED | OUTPATIENT
Start: 2021-04-07 | End: 2021-04-30 | Stop reason: SDUPTHER

## 2021-04-07 RX ORDER — ATORVASTATIN CALCIUM 20 MG/1
40 TABLET, FILM COATED ORAL NIGHTLY
COMMUNITY

## 2021-04-07 ASSESSMENT — ENCOUNTER SYMPTOMS
MEMORY LOSS: 0
CONSTIPATION: 0
DIARRHEA: 0
FEVER: 0
COUGH: 0
PALPITATIONS: 0
BRUISES/BLEEDS EASILY: 0
CHILLS: 0
SHORTNESS OF BREATH: 0
FALLS: 0
SORE THROAT: 0

## 2021-04-07 NOTE — PROGRESS NOTES
[facility]  [facility]  [facility] [place] [address]  Ph: [phone]    NEW PATIENT EVALUATION - AMPUTEE CLINIC      Patient Name: Suly Rodríguez   Patient : 1960  Patient Age: 60 y.o.   PCP: Jimmy Song M.D.    Referring Physician: No ref. provider found   Reason for Referral: Amputee  Examining Physician: Dr. Celina Joyce, DO    SUBJECTIVE:   Patient Identification: Suly Rodríguez is a 60 y.o. female with PMH significant for hypertension, diabetes mellitus, multiple sclerosis, PVD, PAD and rehabilitation history significant for left first through third toe ray amputations Dr. Toro 2004 (first and second toes), 2014 (third toe), right BKA 2015 Dr. Alford, and is presenting to PM&R clinic for a NEW OUTPATIENT evaluation with the following chief complaint/s:    Chief Complaint: Amputee    Background Information:  Original Date of Amputation: Left first through third toe ray amputations Dr. Toro 2004 (first and second toes), 2014 (third toe).  Right BKA 2015 Dr. Alford.  Surgeon: Dr. Toro, Dr. Alford  Etiology: Peripheral vascular disease  Acute Rehab: Y/N  Prosthetist: Robert   Prior Level of Function: Independent with ambulation and ADL's  Current Level of Function: More sedentary due to COVID.     Accompanied by Today: , Sascha  History of Present Illness:   - Records reviewed: Discharge summary from [facility].  In  patient had critical ischemia of the left lower extremity starting in the left first and second toes with subsequent development of gangrene and underwent aortogram 1/10/2014 which showed occlusion of the bilateral popliteal arteries with single-vessel runoff via the anterior tibial artery on the left and possible peroneal on the right side.  This was done by Dr. Toro.  She underwent left distal superficial femoral to anterior tibial artery bypass graft using greater saphenous vein 2/3/2014.  She  subsequently underwent left first and second toe ray amputations with wound debridement 2/7/2014.  She then underwent left third toe ray amputation 2/24/2014.  Unfortunately the bypass did occlude and patient then underwent angioplasty for revascularization 6/25/2014 with Dr. Alford.  Patient then saw Dr. Conrad for angiogram 5/20/2015 and it showed significant tibial disease of the right lower extremity with occlusion of the anterior tibial and posterior tibial artery and proximal peroneal artery.  Options for revascularization were minimal because of the disease within the native artery and vein.  Patient underwent right BKA 12/5/2015 by Dr. Alford c/b hematoma s/p washout 12/16/2015. S/P sharp excisional debridement for necrotic right BKA 2/5/2016 by Dr. Alford.   - Shrinking on prosthetic side. Losing volume and socket ill fitting. Would like to try different ankle design. Hydraulic ankle is a little too soft and looking at different design.   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 Had been going 3x per week to the gym. Then COVID shut down the gym. At baseline doesn't drive.   - Has been depressed lately and has affected her ability to want to participate in exercise.   - Fell on back in her kitchen. Broke a vertebrae and is followed by Dr. Garza with [facility].   - No longer seeing Pain Management. Had been with Dr. Montgomery.   - On Gabapentin 300 mg daily.   - Patient failed driving test in 2018.   - Has never had gait training through PT after amputation.     Medication History (pertinent to amputation):   Gabapentin 300 mg daily PRN, rarely takes  Percocet  mg 1 tablet every 6 hours as needed    Review of Systems:  Review of Systems   Constitutional: Negative for chills and fever.   HENT: Negative for congestion and sore throat.    Respiratory: Negative for cough and shortness of breath.    Cardiovascular: Negative for palpitations and leg swelling.   Gastrointestinal:  Negative for constipation and diarrhea.   Musculoskeletal: Negative for falls and joint pain.        + BKA   Neurological:        Poor balance. + neuropathic pain and phantom limb pain.    Endo/Heme/Allergies: Does not bruise/bleed easily.   Psychiatric/Behavioral: Negative for memory loss.      All other pertinent positive review of systems are noted above in HPI.   All other systems reviewed and are negative.    Past Medical History:  Past Medical History:   Diagnosis Date   • Stroke (HCC) 2013    occasionally has trouble with words/ TIA's, lost vision in left eye   • Dental disorder     upper   • Depression     anxiety   • Diabetes     Oral Meds   • Hypertension    • Renal disorder    • Unspecified cataract       Past Surgical History:   Procedure Laterality Date   • CATARACT PHACO WITH IOL Left 8/10/2016    Procedure: CATARACT PHACO WITH IOL;  Surgeon: Maria T Saini M.D.;  Location: SURGERY SAME DAY [facility];  Service:    • DEBRIDEMENT Right 2/5/2016    Procedure: DEBRIDEMENT of below knee amputation stump and wound VAC placement;  Surgeon: Trenton Alford M.D.;  Location: SURGERY [facility];  Service:    • IRRIGATION & DEBRIDEMENT GENERAL Right 12/16/2015    Procedure: IRRIGATION & DEBRIDEMENT GENERAL BELOW THE KNEE AMPUTATION;  Surgeon: Trenton Alford M.D.;  Location: SURGERY [facility];  Service:    • KNEE AMPUTATION BELOW Right 12/2/2015    Procedure: KNEE AMPUTATION BELOW;  Surgeon: Trenton Alford M.D.;  Location: SURGERY [facility];  Service:    • RECOVERY  5/20/2015    Procedure: VASCULAR-ALFORD-ABDOMINAL AORTOGRAM WITH RUNOFF POSSIBLE INTERVENTION-[id number] ATHEROSCLEROSIS 440.20;  Surgeon: [facility];  [place] [facility]  Service:    • RECOVERY  6/25/2014    Performed by [facility] at SURGERY SAME DAY [facility]   • IRRIGATION & DEBRIDEMENT GENERAL  2/24/2014    Performed by Otilio Toro M.D. at [facility]   • TOE AMPUTATION   2/24/2014    Performed by Otilio Toro M.D. at SURGERY [facility]   • TOE AMPUTATION  2/7/2014    Performed by Otilio Toro M.D. at SURGERY [facility]   • FEMORAL TIBIAL BYPASS  2/3/2014    Performed by Otilio Toro M.D. at SURGERY [facility]   • RECOVERY  1/10/2014    Performed by [facility] at SURGERY [facility]   • OTHER ORTHOPEDIC SURGERY  2007    R shoulder surgery        Past Social History:  Social History     Socioeconomic History   • Marital status:      Spouse name: Not on file   • Number of children: Not on file   • Years of education: Not on file   • Highest education level: Not on file   Occupational History   • Not on file   Tobacco Use   • Smoking status: Never Smoker   • Smokeless tobacco: Never Used   Substance and Sexual Activity   • Alcohol use: Yes     Comment: 1 per week   • Drug use: No   • Sexual activity: Not on file   Other Topics Concern   • Not on file   Social History Narrative   • Not on file     Social Determinants of Health     Financial Resource Strain:    • Difficulty of Paying Living Expenses:    Food Insecurity:    • Worried About Running Out of Food in the Last Year:    • Ran Out of Food in the Last Year:    Transportation Needs:    • Lack of Transportation (Medical):    • Lack of Transportation (Non-Medical):    Physical Activity:    • Days of Exercise per Week:    • Minutes of Exercise per Session:    Stress:    • Feeling of Stress :    Social Connections:    • Frequency of Communication with Friends and Family:    • Frequency of Social Gatherings with Friends and Family:    • Attends Yarsani Services:    • Active Member of Clubs or Organizations:    • Attends Club or Organization Meetings:    • Marital Status:    Intimate Partner Violence:    • Fear of Current or Ex-Partner:    • Emotionally Abused:    • Physically Abused:    • Sexually Abused:         Family History:  History reviewed. No pertinent family history.    Depression and  Opioid Screening  PHQ-9:  No flowsheet data found.  Interpretation of PHQ-9 Total Score   Score Severity   1-4 No Depression   5-9 Mild Depression   10-14 Moderate Depression   15-19 Moderately Severe Depression   20-27 Severe Depression     Opioid Risk Score: No value filed.  Interpretation of Opioid Risk Score   Score 0-3 = Low risk of abuse. Do UDS at least once per year.  Score 4-7 = Moderate risk of abuse. Do UDS 1-4 times per year.  Score 8+ = High risk of abuse. Refer to specialist.      OBJECTIVE:   Vital Signs:  Vitals:    04/07/21 1252   BP: 128/78   Pulse: 70   Resp: 18   Temp: 36.3 °C (97.4 °F)   SpO2: 97%        Physical Exam:   GEN: No apparent distress  HEENT: Head normocephalic, atraumatic.  Sclera nonicteric bilaterally, no ocular discharge appreciated bilaterally.  CV: Extremities warm and well-perfused, no peripheral edema appreciated bilaterally.  PULMONARY: Breathing nonlabored on room air, no respiratory accessory muscle use.  Not requiring supplemental oxygen.  ABD: Soft, nontender.  SKIN: No appreciable skin breakdown on exposed areas of skin.  No skin breakdown at residual limb.  NEURO: Awake alert.  Conversational.  Logical thought content.  PSYCH: Mood and affect within normal limits.  MSK: Range of motion of right residual limb at the knee and hip is within normal limits.  Strength intact 5/5 in hip abduction, adduction, flexion.  Strength in knee extension and flexion 5/5.  Very sensitive area of distal residual limb.  Mildly antalgic gait but does ambulate without assistive device.      ASSESSMENT/PLAN: Suly Rodríguez  is a 60 y.o. female with rehabilitation history significant for right BKA 12/2/2015 Dr. Alford, here for new amputee evaluation. The following plan was discussed with the patient who is in agreement.     Visit Diagnoses     ICD-10-CM   1. Hx of right BKA (HCC)  Z89.511   2. Neuropathic pain  M79.2   3. Depressed mood  R45.89        Rehab/Ortho/Vasc:   1. Right BKA  12/2/2015 Dr. Alford   2. Left first through third toe ray amputations Dr. Toro 2/7/2004 (first and second toes), 2/24/2014 (third toe)   -Records reviewed as aforementioned in the HPI.   -Desired and ability to use prosthesis: Patient has desire and capability to physically and cognitively use the prosthetic limb.  -Reason for prosthetic and/or replacement: weight change, decrease in volume of residual limb  -Additional: New foot could improve balance and dynamic control.   -K level: K3 Community Ambulator: Patient has the ability or potential for ambulation with variable darlene, to traverse most environmental barriers, and may have vocational, therapeutic, or exercise activity that demands prosthetic utilization beyond simple locomotion.  -Daily activities and frequency: Patient active in going to gym, involved in trivia with friends and spending time with family. All of these activities take her out of the home frequently. Current ill fitting prosthetic limits ability for involvement in activity.   -Out-of-home frequency is: Daily every week, multiple times per day.  -Comorbidities and prescribed medications: Diabetes mellitus (Metformin, insulin, Actos), hypertension (lisinopril, furosemide, amlodipine), peripheral vascular disease (DAPT)    Neuropathic Pain: Has gabapentin 300 mg daily taken as needed, rarely takes.  Does have phantom limb pain and possible neuroma at distal residual limb.  -Med management: Prescription for Pamelor 25 mg at night to address pain as well as mood.  -Med management: Stop gabapentin    Mood/Sleep:   -Med management: Prescription for Pamelor 25 mg nightly.    Follow up: 4 weeks for med reevaluation.    Total time spent was 55 minutes.  Included in this time is the time spent preparing for the visit including record review, my exam and evaluation, counseling and education regarding that which is aforementioned in the assessment and plan. Time was spent ordering the appropriate  labs, tests, procedures, referrals, medications.  Including this time was also the time spent in care coordination with prosthetists. Included this time as the time spent obtaining history from someone other than the patient. Discussion involved the patient and .    Please note that this dictation was created using voice recognition software. I have made every reasonable attempt to correct obvious errors but there may be errors of grammar and content that I may have overlooked prior to finalization of this note.    Dr. Celina Joyce DO, MS  Department of Physical Medicine & Rehabilitation  [facility]  [facility]

## 2021-04-30 DIAGNOSIS — M79.2 NEUROPATHIC PAIN: ICD-10-CM

## 2021-04-30 DIAGNOSIS — Z89.511 HX OF RIGHT BKA (HCC): ICD-10-CM

## 2021-04-30 RX ORDER — NORTRIPTYLINE HYDROCHLORIDE 25 MG/1
25 CAPSULE ORAL
Qty: 90 CAPSULE | Refills: 0 | Status: SHIPPED | OUTPATIENT
Start: 2021-04-30 | End: 2021-05-06 | Stop reason: SINTOL

## 2021-05-06 ENCOUNTER — OFFICE VISIT (OUTPATIENT)
Dept: PHYSICAL MEDICINE AND REHAB | Facility: REHABILITATION | Age: 61
End: 2021-05-06
Payer: COMMERCIAL

## 2021-05-06 DIAGNOSIS — G54.6 PHANTOM LIMB PAIN (HCC): ICD-10-CM

## 2021-05-06 DIAGNOSIS — M54.50 MIDLINE LOW BACK PAIN WITHOUT SCIATICA, UNSPECIFIED CHRONICITY: ICD-10-CM

## 2021-05-06 DIAGNOSIS — Z89.511 HX OF RIGHT BKA (HCC): Primary | ICD-10-CM

## 2021-05-06 DIAGNOSIS — M79.2 NEUROPATHIC PAIN: ICD-10-CM

## 2021-05-06 PROCEDURE — 99215 OFFICE O/P EST HI 40 MIN: CPT | Performed by: PHYSICAL MEDICINE & REHABILITATION

## 2021-05-06 ASSESSMENT — ENCOUNTER SYMPTOMS
SENSORY CHANGE: 0
BACK PAIN: 1
SHORTNESS OF BREATH: 0
DIZZINESS: 0
FOCAL WEAKNESS: 0
FEVER: 0
FALLS: 0
CHILLS: 0
COUGH: 0
ROS SKIN COMMENTS: NO SKIN BREAKDOWN.

## 2021-05-06 NOTE — PROGRESS NOTES
[facility]  PM&R Neuro Rehabilitation Clinic  [facility] [place] [address]  Ph: [phone]    FOLLOW UP PATIENT EVALUATION - AMPUTEE CLINIC      Patient Name: Suly Rodríguez   Patient : 1960  Patient Age: 60 y.o.   PCP: Jimmy Song M.D.  Examining Physician: Dr. Celina Joyce, DO    SUBJECTIVE:   Patient Identification: Suly Rodríguez is a 60 y.o. female with PMH significant for hypertension, diabetes mellitus, multiple sclerosis, PVD, PAD and rehabilitation history significant for left first through third toe ray amputations Dr. Toro 2004 (first and second toes), 2014 (third toe), right BKA 2015 Dr. Alford, and is presenting to PM&R clinic for a FOLLOW UP OUTPATIENT evaluation with the following chief complaint/s:    Chief Complaint: Back pain    Background Information:  Original Date of Amputation: Left first through third toe ray amputations Dr. Toro 2004 (first and second toes), 2014 (third toe).  Right SHANELLE 2015 Dr. Alford.  Surgeon: Dr. Toro, Dr. Alford  Etiology: Peripheral vascular disease  Prosthetist: Robert   Prior Level of Function: Independent with ambulation and ADL's  Current Level of Function: More sedentary due to COVID.     Accompanied by Today: , Sascha  History of Present Illness:   - Has been taking Ambien for a long time.   - Has been groggy lately in the morning not sure if related to Pamelor or not. Has had constant grogginess.   - Had been rx for Trazodone through PCP. Filled . Tried it and made her feel like she was getting light sleep and crazy dreams.   - Does not seem to have any side effects from the Gabapentin.   - Sometimes gets phantom limb pain. It's really not bothersome. Sometimes gets a jolt, but not a constant.   - Last had trigger point injections through pain management. Does not think that the injections work.   - Has low back pain for the most part. Has broken her back twice. First time was ~  and  then again in 11/2020. This was because of a fall. Saw pain management and didn't offer much solution. Then went to [facility] and had been seeing Dr. Garza. Had MRI T spine and L spine. Patient reports pain has improved compared to what it was, no longer seeing Dr. Garza because it was managed nonoperatively.  Patient had been on narcotics in the past for other reasons related to the amputation, but wishes to avoid narcotics if at all possible.  Back pain nonradiating.  -We will be getting new socket within the next couple of weeks.  Needs new referral to outpatient therapy given the contract between [facility] and [facility] is up in the air.    Medication History (pertinent to amputation):   Gabapentin 300 mg daily PRN, rarely takes  Percocet  mg 1 tablet every 6 hours as needed    Review of Systems:  Review of Systems   Constitutional: Positive for malaise/fatigue ( After taking medications the night before.). Negative for chills and fever.   Respiratory: Negative for cough and shortness of breath.    Musculoskeletal: Positive for back pain. Negative for falls and joint pain.   Skin:        No skin breakdown.   Neurological: Negative for dizziness, sensory change and focal weakness.        Mild phantom limb pain.      All other pertinent positive review of systems are noted above in HPI.   All other systems reviewed and are negative.    Past Medical History:  Past Medical History:   Diagnosis Date   • Stroke (HCC) 2013    occasionally has trouble with words/ TIA's, lost vision in left eye   • Dental disorder     upper   • Depression     anxiety   • Diabetes     Oral Meds   • Hypertension    • Renal disorder    • Unspecified cataract       Past Surgical History:   Procedure Laterality Date   • CATARACT PHACO WITH IOL Left 8/10/2016    Procedure: CATARACT PHACO WITH IOL;  Surgeon: Maria T Saini M.D.;  Location: SURGERY SAME DAY [facility];  Service:    • DEBRIDEMENT Right 2/5/2016    Procedure: DEBRIDEMENT of below knee  amputation stump and wound VAC placement;  Surgeon: Trenton Alford M.D.;  [place] [facility];  Service:    • IRRIGATION & DEBRIDEMENT GENERAL Right 12/16/2015    Procedure: IRRIGATION & DEBRIDEMENT GENERAL BELOW THE KNEE AMPUTATION;  Surgeon: Trenton Alford M.D.;  [place] [facility];  Service:    • KNEE AMPUTATION BELOW Right 12/2/2015    Procedure: KNEE AMPUTATION BELOW;  Surgeon: Trenton Alford M.D.;  [place]: [place] [facility];  Service:    • RECOVERY  5/20/2015    Procedure: VASCULAR-ALFORD-ABDOMINAL AORTOGRAM WITH RUNOFF POSSIBLE INTERVENTION-[id number] ATHEROSCLEROSIS 440.20;  Surgeon: [facility] Surgery;  Location: SURGERY PRE-[place] PROC UNIT [facility];  Service:    • RECOVERY  6/25/2014    Performed by [facility] at SURGERY SAME DAY [facility]   • IRRIGATION & DEBRIDEMENT GENERAL  2/24/2014    Performed by Otilio Toro M.D. at SURGERY [facility] [place]   • TOE AMPUTATION  2/24/2014    Performed by Otilio Toro M.D. at SURGERY [facility] [place]   • TOE AMPUTATION  2/7/2014    Performed by Otilio Toro M.D. at SURGERY [facility]   • FEMORAL TIBIAL BYPASS  2/3/2014    Performed by Otilio Toro M.D. at SURGERY [facility] [place]   • RECOVERY  1/10/2014    Performed by [facility] at [facility]   • OTHER ORTHOPEDIC SURGERY  2007    R shoulder surgery        Past Social History:  Social History     Socioeconomic History   • Marital status:      Spouse name: Not on file   • Number of children: Not on file   • Years of education: Not on file   • Highest education level: Not on file   Occupational History   • Not on file   Tobacco Use   • Smoking status: Never Smoker   • Smokeless tobacco: Never Used   Substance and Sexual Activity   • Alcohol use: Yes     Comment: 1 per week   • Drug use: No   • Sexual activity: Not on file   Other Topics Concern   • Not on file   Social History Narrative   • Not on file     Social Determinants of Health      Financial Resource Strain:    • Difficulty of Paying Living Expenses:    Food Insecurity:    • Worried About Running Out of Food in the Last Year:    • Ran Out of Food in the Last Year:    Transportation Needs:    • Lack of Transportation (Medical):    • Lack of Transportation (Non-Medical):    Physical Activity:    • Days of Exercise per Week:    • Minutes of Exercise per Session:    Stress:    • Feeling of Stress :    Social Connections:    • Frequency of Communication with Friends and Family:    • Frequency of Social Gatherings with Friends and Family:    • Attends Yarsanism Services:    • Active Member of Clubs or Organizations:    • Attends Club or Organization Meetings:    • Marital Status:    Intimate Partner Violence:    • Fear of Current or Ex-Partner:    • Emotionally Abused:    • Physically Abused:    • Sexually Abused:         Family History:  History reviewed. No pertinent family history.    Depression and Opioid Screening  PHQ-9:  No flowsheet data found.  Interpretation of PHQ-9 Total Score   Score Severity   1-4 No Depression   5-9 Mild Depression   10-14 Moderate Depression   15-19 Moderately Severe Depression   20-27 Severe Depression     Opioid Risk Score: No value filed.  Interpretation of Opioid Risk Score   Score 0-3 = Low risk of abuse. Do UDS at least once per year.  Score 4-7 = Moderate risk of abuse. Do UDS 1-4 times per year.  Score 8+ = High risk of abuse. Refer to specialist.      OBJECTIVE:   Vital Signs:  There were no vitals filed for this visit.     Physical Exam:   GEN: No apparent distress  HEENT: Head normocephalic, atraumatic.  Sclera nonicteric bilaterally, no ocular discharge appreciated bilaterally.  CV: Extremities warm and well-perfused, no peripheral edema appreciated bilaterally.  PULMONARY: Breathing nonlabored on room air, no respiratory accessory muscle use.  Not requiring supplemental oxygen.  SKIN: No appreciable skin breakdown on exposed areas of skin.  PSYCH:  Mood and affect within normal limits.  NEURO: Awake alert.  Conversational.  Logical thought content.  MSK: Multiple areas of tenderness to palpation along paraspinal musculature bilaterally.  No tenderness to palpation after percussion over vertebral processes.    ASSESSMENT/PLAN: Suly Rodríguez  is a 60 y.o. female with rehabilitation history significant for right BKA 12/2/2015 Dr. Alford, here for new amputee evaluation. The following plan was discussed with the patient who is in agreement.     Visit Diagnoses     ICD-10-CM   1. Hx of right BKA ([facility])  Z89.511   2. Neuropathic pain  M79.2   3. Phantom limb pain ([facility])  G54.6   4. Midline low back pain without sciatica, unspecified chronicity  M54.5       assists with history    Rehab/Ortho/Vasc:   1. Right BKA 12/2/2015 Dr. Alford   2. Left first through third toe ray amputations Dr. Toro 2/7/2004 (first and second toes), 2/24/2014 (third toe)   -Prosthesis: New socket fabricated within the next couple of weeks.  -Therapy: Patient will need new referral to therapy ([facility]) given [facility] contract negotiations might lead to no insurance coverage for [facility] providers.    Low back pain: Axial, nonradicular.  Associated with significant paraspinal musculature tightness.  Has tried Flexeril but makes her too sleepy.  Had narcotics in the past more so for amputation complications, no longer on narcotics and wishes to stay off of them. H/o 2 back fractures, these records are not available to me.  Had initially been established with pain management through Dr. Montgomery, no longer seeing as she is not narcotic seeking and did not think that they had options for her back pain.  Back pain is largely improved compared to prior but still present.  Patient really wishes to be more active in terms of exercise and weight loss which she thinks would ultimately really help her back, but it has been hard to get over the pain to get to that point and Covid has  complicated things due to sedentary state.  Patient was referred to Dr. Garza of [facility] who ordered MRI L-spine and T-spine.  These imaging records are unavailable to me.  Per patient the treatment was nonsurgical and was discharged from their clinic.  -Counseled patient that I do not think trigger points would be beneficial for her as her muscular tightness and areas of pain are to numerous to warrant that many injections.  -Could consider soft tissue manipulation through either therapy or chiropractor.  -Referral: Physical medicine and rehabilitation interventional spine specialist  -Record request: MRI L-spine and T-spine from [place], [facility] as well as reports.    Neuropathic Pain: Phantom limb pain both the left and the right. Has gabapentin 300 mg daily taken as needed, rarely takes.   -Med management: Stop Pamelor.  Inefficacious.  -Discussion with patient regarding her phantom limb pain.  At this point is really not bothersome.  It is present and does give her adult sometimes but is largely not bothersome.  It is not something she wishes to have medications for.    Mood/Sleep:   -Med management: Stop Pamelor 25 mg nightly  -Med management: Prescribed trazodone by PCP, recommend that she retry trazodone once off of Pamelor given that she may have had side effects due to the combination of both.  -Continue management with PCP for sleep.    Follow up: To be determined based on [facility] contract negotiations.    Total time spent was 44 minutes.  Included in this time is the time spent preparing for the visit including record review, my exam and evaluation, counseling and education regarding that which is aforementioned in the assessment and plan. Time was spent ordering the appropriate labs, tests, procedures, referrals, medications. Included this time as the time spent obtaining history from someone other than the patient. Discussion involved the patient and .    Please note that this dictation was  created using voice recognition software. I have made every reasonable attempt to correct obvious errors but there may be errors of grammar and content that I may have overlooked prior to finalization of this note.    Dr. Celina Joyce DO, MS  Department of Physical Medicine & Rehabilitation  Neuro Rehabilitation Clinic  [facility]

## 2021-05-18 ENCOUNTER — APPOINTMENT (OUTPATIENT)
Dept: PHYSICAL THERAPY | Facility: REHABILITATION | Age: 61
End: 2021-05-18
Attending: PHYSICAL MEDICINE & REHABILITATION
Payer: COMMERCIAL

## 2021-05-18 ENCOUNTER — HOSPITAL ENCOUNTER (OUTPATIENT)
Dept: RADIOLOGY | Facility: MEDICAL CENTER | Age: 61
End: 2021-05-18
Payer: COMMERCIAL

## 2021-05-18 DIAGNOSIS — Z89.511 HX OF RIGHT BKA (HCC): ICD-10-CM

## 2021-05-18 DIAGNOSIS — M54.50 MIDLINE LOW BACK PAIN WITHOUT SCIATICA, UNSPECIFIED CHRONICITY: ICD-10-CM

## 2021-05-19 DIAGNOSIS — Z89.511 HX OF RIGHT BKA (HCC): ICD-10-CM

## 2021-08-12 DIAGNOSIS — Z89.511 HX OF RIGHT BKA (HCC): ICD-10-CM

## 2021-08-12 DIAGNOSIS — G54.6 PHANTOM LIMB PAIN (HCC): ICD-10-CM

## 2021-08-16 ENCOUNTER — HOSPITAL ENCOUNTER (INPATIENT)
Dept: HOSPITAL 8 - ED | Age: 61
LOS: 10 days | DRG: 870 | End: 2021-08-26
Attending: INTERNAL MEDICINE | Admitting: INTERNAL MEDICINE
Payer: COMMERCIAL

## 2021-08-16 VITALS — WEIGHT: 293 LBS | BODY MASS INDEX: 43.4 KG/M2 | HEIGHT: 69 IN

## 2021-08-16 VITALS — SYSTOLIC BLOOD PRESSURE: 138 MMHG | DIASTOLIC BLOOD PRESSURE: 74 MMHG

## 2021-08-16 DIAGNOSIS — Z20.822: ICD-10-CM

## 2021-08-16 DIAGNOSIS — J15.4: ICD-10-CM

## 2021-08-16 DIAGNOSIS — I50.33: ICD-10-CM

## 2021-08-16 DIAGNOSIS — Z51.5: ICD-10-CM

## 2021-08-16 DIAGNOSIS — J15.0: ICD-10-CM

## 2021-08-16 DIAGNOSIS — Z79.899: ICD-10-CM

## 2021-08-16 DIAGNOSIS — Z99.11: ICD-10-CM

## 2021-08-16 DIAGNOSIS — E88.09: ICD-10-CM

## 2021-08-16 DIAGNOSIS — E11.51: ICD-10-CM

## 2021-08-16 DIAGNOSIS — J96.01: ICD-10-CM

## 2021-08-16 DIAGNOSIS — Z89.511: ICD-10-CM

## 2021-08-16 DIAGNOSIS — G40.909: ICD-10-CM

## 2021-08-16 DIAGNOSIS — I46.9: ICD-10-CM

## 2021-08-16 DIAGNOSIS — D68.9: ICD-10-CM

## 2021-08-16 DIAGNOSIS — I11.0: ICD-10-CM

## 2021-08-16 DIAGNOSIS — Z86.73: ICD-10-CM

## 2021-08-16 DIAGNOSIS — N30.90: ICD-10-CM

## 2021-08-16 DIAGNOSIS — R65.21: ICD-10-CM

## 2021-08-16 DIAGNOSIS — A41.9: Primary | ICD-10-CM

## 2021-08-16 DIAGNOSIS — I27.20: ICD-10-CM

## 2021-08-16 DIAGNOSIS — I47.1: ICD-10-CM

## 2021-08-16 DIAGNOSIS — Z66: ICD-10-CM

## 2021-08-16 DIAGNOSIS — G54.6 PHANTOM LIMB PAIN (HCC): ICD-10-CM

## 2021-08-16 DIAGNOSIS — R57.0: ICD-10-CM

## 2021-08-16 DIAGNOSIS — E87.4: ICD-10-CM

## 2021-08-16 DIAGNOSIS — E83.51: ICD-10-CM

## 2021-08-16 DIAGNOSIS — N17.0: ICD-10-CM

## 2021-08-16 DIAGNOSIS — G83.9: ICD-10-CM

## 2021-08-16 DIAGNOSIS — Z89.511 HX OF RIGHT BKA (HCC): ICD-10-CM

## 2021-08-16 DIAGNOSIS — G93.41: ICD-10-CM

## 2021-08-16 DIAGNOSIS — G93.89: ICD-10-CM

## 2021-08-16 DIAGNOSIS — E66.01: ICD-10-CM

## 2021-08-16 DIAGNOSIS — G93.1: ICD-10-CM

## 2021-08-16 DIAGNOSIS — Z78.9: ICD-10-CM

## 2021-08-16 LAB
<PLATELET ESTIMATE>: ADEQUATE
<PLT MORPHOLOGY>: (no result)
ALBUMIN SERPL-MCNC: 2.9 G/DL (ref 3.4–5)
ALP SERPL-CCNC: 93 U/L (ref 45–117)
ALT SERPL-CCNC: 41 U/L (ref 12–78)
ANION GAP SERPL CALC-SCNC: 13 MMOL/L (ref 5–15)
ANION GAP SERPL CALC-SCNC: 6 MMOL/L (ref 5–15)
ANISOCYTOSIS BLD QL SMEAR: (no result)
APTT BLD: 27 SECONDS (ref 25–31)
BASOPHILS # BLD AUTO: 0.1 X10^3/UL (ref 0–0.1)
BASOPHILS NFR BLD AUTO: 1 % (ref 0–1)
BILIRUB SERPL-MCNC: 0.7 MG/DL (ref 0.2–1)
CALCIUM SERPL-MCNC: 8 MG/DL (ref 8.5–10.1)
CALCIUM SERPL-MCNC: 8.2 MG/DL (ref 8.5–10.1)
CHLORIDE SERPL-SCNC: 107 MMOL/L (ref 98–107)
CHLORIDE SERPL-SCNC: 109 MMOL/L (ref 98–107)
CREAT SERPL-MCNC: 1.73 MG/DL (ref 0.55–1.02)
CREAT SERPL-MCNC: 2.06 MG/DL (ref 0.55–1.02)
EOSINOPHIL # BLD AUTO: 0.2 X10^3/UL (ref 0–0.4)
EOSINOPHIL NFR BLD AUTO: 1 % (ref 1–7)
ERYTHROCYTE [DISTWIDTH] IN BLOOD BY AUTOMATED COUNT: 18.6 % (ref 9.6–15.2)
EST. AVERAGE GLUCOSE BLD GHB EST-MCNC: 117 MG/DL (ref 0–126)
INR PPP: 1.18 (ref 0.93–1.1)
LYMPHOCYTES # BLD AUTO: 2.7 X10^3/UL (ref 1–3.4)
LYMPHOCYTES NFR BLD AUTO: 19 % (ref 22–44)
MCH RBC QN AUTO: 26.5 PG (ref 27–34.8)
MCHC RBC AUTO-ENTMCNC: 30.2 G/DL (ref 32.4–35.8)
MICROSCOPIC: (no result)
MONOCYTES # BLD AUTO: 0.8 X10^3/UL (ref 0.2–0.8)
MONOCYTES NFR BLD AUTO: 6 % (ref 2–9)
NEUTROPHILS # BLD AUTO: 10.4 X10^3/UL (ref 1.8–6.8)
NEUTROPHILS NFR BLD AUTO: 74 % (ref 42–75)
O2/TOTAL GAS SETTING VFR VENT: 100 %
PLATELET # BLD AUTO: 235 X10^3/UL (ref 130–400)
PMV BLD AUTO: 9.4 FL (ref 7.4–10.4)
POLYCHROMASIA BLD QL SMEAR: (no result)
PROT SERPL-MCNC: 6.6 G/DL (ref 6.4–8.2)
PROTHROMBIN TIME: 12.5 SECONDS (ref 9.6–11.5)
RBC # BLD AUTO: 5.76 X10^6/UL (ref 3.82–5.3)
TRIGL SERPL-MCNC: 108 MG/DL (ref 50–200)
TROPONIN I SERPL-MCNC: 0.05 NG/ML (ref 0–0.04)
TROPONIN I SERPL-MCNC: < 0.015 NG/ML (ref 0–0.04)
VANCOMYCIN TROUGH SERPL-MCNC: < 1.7 MG/DL (ref 2.8–20)

## 2021-08-16 PROCEDURE — 84484 ASSAY OF TROPONIN QUANT: CPT

## 2021-08-16 PROCEDURE — 92950 HEART/LUNG RESUSCITATION CPR: CPT

## 2021-08-16 PROCEDURE — 87147 CULTURE TYPE IMMUNOLOGIC: CPT

## 2021-08-16 PROCEDURE — 80048 BASIC METABOLIC PNL TOTAL CA: CPT

## 2021-08-16 PROCEDURE — 87086 URINE CULTURE/COLONY COUNT: CPT

## 2021-08-16 PROCEDURE — C8924 2D TTE W OR W/O FOL W/CON,FU: HCPCS

## 2021-08-16 PROCEDURE — 80320 DRUG SCREEN QUANTALCOHOLS: CPT

## 2021-08-16 PROCEDURE — 82962 GLUCOSE BLOOD TEST: CPT

## 2021-08-16 PROCEDURE — 83735 ASSAY OF MAGNESIUM: CPT

## 2021-08-16 PROCEDURE — 93321 DOPPLER ECHO F-UP/LMTD STD: CPT

## 2021-08-16 PROCEDURE — 70450 CT HEAD/BRAIN W/O DYE: CPT

## 2021-08-16 PROCEDURE — 5A1955Z RESPIRATORY VENTILATION, GREATER THAN 96 CONSECUTIVE HOURS: ICD-10-PCS | Performed by: INTERNAL MEDICINE

## 2021-08-16 PROCEDURE — 87324 CLOSTRIDIUM AG IA: CPT

## 2021-08-16 PROCEDURE — 72125 CT NECK SPINE W/O DYE: CPT

## 2021-08-16 PROCEDURE — 36600 WITHDRAWAL OF ARTERIAL BLOOD: CPT

## 2021-08-16 PROCEDURE — 87081 CULTURE SCREEN ONLY: CPT

## 2021-08-16 PROCEDURE — 74018 RADEX ABDOMEN 1 VIEW: CPT

## 2021-08-16 PROCEDURE — 83036 HEMOGLOBIN GLYCOSYLATED A1C: CPT

## 2021-08-16 PROCEDURE — 85025 COMPLETE CBC W/AUTO DIFF WBC: CPT

## 2021-08-16 PROCEDURE — 84132 ASSAY OF SERUM POTASSIUM: CPT

## 2021-08-16 PROCEDURE — 31500 INSERT EMERGENCY AIRWAY: CPT

## 2021-08-16 PROCEDURE — 80299 QUANTITATIVE ASSAY DRUG: CPT

## 2021-08-16 PROCEDURE — 95712 VEEG 2-12 HR INTMT MNTR: CPT

## 2021-08-16 PROCEDURE — 93325 DOPPLER ECHO COLOR FLOW MAPG: CPT

## 2021-08-16 PROCEDURE — 84100 ASSAY OF PHOSPHORUS: CPT

## 2021-08-16 PROCEDURE — 87205 SMEAR GRAM STAIN: CPT

## 2021-08-16 PROCEDURE — 94002 VENT MGMT INPAT INIT DAY: CPT

## 2021-08-16 PROCEDURE — U0003 INFECTIOUS AGENT DETECTION BY NUCLEIC ACID (DNA OR RNA); SEVERE ACUTE RESPIRATORY SYNDROME CORONAVIRUS 2 (SARS-COV-2) (CORONAVIRUS DISEASE [COVID-19]), AMPLIFIED PROBE TECHNIQUE, MAKING USE OF HIGH THROUGHPUT TECHNOLOGIES AS DESCRIBED BY CMS-2020-01-R: HCPCS

## 2021-08-16 PROCEDURE — 87040 BLOOD CULTURE FOR BACTERIA: CPT

## 2021-08-16 PROCEDURE — 71045 X-RAY EXAM CHEST 1 VIEW: CPT

## 2021-08-16 PROCEDURE — 5A12012 PERFORMANCE OF CARDIAC OUTPUT, SINGLE, MANUAL: ICD-10-PCS | Performed by: INTERNAL MEDICINE

## 2021-08-16 PROCEDURE — 85730 THROMBOPLASTIN TIME PARTIAL: CPT

## 2021-08-16 PROCEDURE — 82947 ASSAY GLUCOSE BLOOD QUANT: CPT

## 2021-08-16 PROCEDURE — 96375 TX/PRO/DX INJ NEW DRUG ADDON: CPT

## 2021-08-16 PROCEDURE — 82330 ASSAY OF CALCIUM: CPT

## 2021-08-16 PROCEDURE — G0480 DRUG TEST DEF 1-7 CLASSES: HCPCS

## 2021-08-16 PROCEDURE — 83605 ASSAY OF LACTIC ACID: CPT

## 2021-08-16 PROCEDURE — 87070 CULTURE OTHR SPECIMN AEROBIC: CPT

## 2021-08-16 PROCEDURE — 96374 THER/PROPH/DIAG INJ IV PUSH: CPT

## 2021-08-16 PROCEDURE — 71250 CT THORAX DX C-: CPT

## 2021-08-16 PROCEDURE — 82803 BLOOD GASES ANY COMBINATION: CPT

## 2021-08-16 PROCEDURE — 36556 INSERT NON-TUNNEL CV CATH: CPT

## 2021-08-16 PROCEDURE — 70551 MRI BRAIN STEM W/O DYE: CPT

## 2021-08-16 PROCEDURE — 80053 COMPREHEN METABOLIC PANEL: CPT

## 2021-08-16 PROCEDURE — 80307 DRUG TEST PRSMV CHEM ANLYZR: CPT

## 2021-08-16 PROCEDURE — 02HV33Z INSERTION OF INFUSION DEVICE INTO SUPERIOR VENA CAVA, PERCUTANEOUS APPROACH: ICD-10-PCS | Performed by: INTERNAL MEDICINE

## 2021-08-16 PROCEDURE — 85610 PROTHROMBIN TIME: CPT

## 2021-08-16 PROCEDURE — 82550 ASSAY OF CK (CPK): CPT

## 2021-08-16 PROCEDURE — 87077 CULTURE AEROBIC IDENTIFY: CPT

## 2021-08-16 PROCEDURE — 87186 SC STD MICRODIL/AGAR DIL: CPT

## 2021-08-16 PROCEDURE — 84478 ASSAY OF TRIGLYCERIDES: CPT

## 2021-08-16 PROCEDURE — 93005 ELECTROCARDIOGRAM TRACING: CPT

## 2021-08-16 PROCEDURE — 0BH17EZ INSERTION OF ENDOTRACHEAL AIRWAY INTO TRACHEA, VIA NATURAL OR ARTIFICIAL OPENING: ICD-10-PCS | Performed by: INTERNAL MEDICINE

## 2021-08-16 PROCEDURE — 36415 COLL VENOUS BLD VENIPUNCTURE: CPT

## 2021-08-16 PROCEDURE — 94003 VENT MGMT INPAT SUBQ DAY: CPT

## 2021-08-16 PROCEDURE — 80329 ANALGESICS NON-OPIOID 1 OR 2: CPT

## 2021-08-16 PROCEDURE — 81001 URINALYSIS AUTO W/SCOPE: CPT

## 2021-08-16 RX ADMIN — CEFTRIAXONE SCH MLS/HR: 2 INJECTION, POWDER, FOR SOLUTION INTRAMUSCULAR; INTRAVENOUS at 19:25

## 2021-08-16 RX ADMIN — FAMOTIDINE SCH MG: 10 INJECTION INTRAVENOUS at 22:31

## 2021-08-16 RX ADMIN — INSULIN HUMAN PRN MLS/HR: 100 INJECTION, SOLUTION PARENTERAL at 22:48

## 2021-08-16 RX ADMIN — WHITE PETROLATUM SCH APPLIC: .15; .85 OINTMENT OPHTHALMIC at 19:30

## 2021-08-16 RX ADMIN — SODIUM CHLORIDE, SODIUM LACTATE, POTASSIUM CHLORIDE, AND CALCIUM CHLORIDE SCH MLS/HR: .6; .31; .03; .02 INJECTION, SOLUTION INTRAVENOUS at 22:11

## 2021-08-16 RX ADMIN — MIDAZOLAM HYDROCHLORIDE PRN MLS/HR: 5 INJECTION, SOLUTION INTRAMUSCULAR; INTRAVENOUS at 18:01

## 2021-08-16 RX ADMIN — DOXYCYCLINE SCH MLS/HR: 100 INJECTION, POWDER, LYOPHILIZED, FOR SOLUTION INTRAVENOUS at 22:31

## 2021-08-16 RX ADMIN — PROPOFOL PRN MLS/HR: 10 INJECTION, EMULSION INTRAVENOUS at 22:41

## 2021-08-16 RX ADMIN — Medication SCH NOTE: at 20:30

## 2021-08-16 NOTE — NUR
Central line placement complete.  per Dr. Rios at bedside, OK to begin levophed 
infusion in central line prior to line verification by imaging

## 2021-08-16 NOTE — NUR
pt has R BKA with prosthetic in place.  



prosthetic has been removed by Dr. Rios, +redness to stump and redness to inner 
knee.



pt also missing 1st to 3rd toes on L foot and has feces on the bottom of hre 
foot inside her shoe

## 2021-08-16 NOTE — NUR
CXR has been to bedside



Dr. Rios currently having discussion with family members



per Dr. Rios, pt to have hypothermic protocol after head CT

## 2021-08-16 NOTE — NUR
pt BIB ambulance after a witnessd cardiac arrest



per report, pt went to her doctor today for "a neuro issue" and after she got 
home, she collapsed on her proch.  pt had CPR initiated by family on scene.  
when EMS arrived, pt was found to be in PEA.  epi was given x2 and then pulses 
returned



pt was hypotensive on scene at 86/60 and was given about 70cc of NS through IO 
access



upon admit, pt currently has an LMA n place and BVM in progress



FSBS 



RT present, pharmacy present



1608: NS 1L infusion initiated pe Dr. Rios at bedside

1610: 20mg etomidate administered IVP

1613: 200mg succinylcholine administered IVP



1615:  LMA removed and 8.0 ET tube placed, 22cm @ lip.  placement verified by 
MD at bedside and EtCO2

## 2021-08-16 NOTE — NUR
Dr Rios at bedside for central line placement



pt B/P continues to decline.  pharmacy has been contacted for levophed. 
awaiting delivery

## 2021-08-16 NOTE — NUR
2nd Liter NS started per VO from Dr. Rios at bedside



100mcg IVP given per VO from Dr. Rios at bedside

## 2021-08-17 LAB
ALBUMIN SERPL-MCNC: 3 G/DL (ref 3.4–5)
ALP SERPL-CCNC: 83 U/L (ref 45–117)
ALT SERPL-CCNC: 38 U/L (ref 12–78)
ANION GAP SERPL CALC-SCNC: 10 MMOL/L (ref 5–15)
ANION GAP SERPL CALC-SCNC: 7 MMOL/L (ref 5–15)
BASOPHILS # BLD AUTO: 0 X10^3/UL (ref 0–0.1)
BASOPHILS NFR BLD AUTO: 0 % (ref 0–1)
BILIRUB SERPL-MCNC: 0.9 MG/DL (ref 0.2–1)
CALCIUM SERPL-MCNC: 7.8 MG/DL (ref 8.5–10.1)
CALCIUM SERPL-MCNC: 8.4 MG/DL (ref 8.5–10.1)
CHLORIDE SERPL-SCNC: 108 MMOL/L (ref 98–107)
CHLORIDE SERPL-SCNC: 110 MMOL/L (ref 98–107)
CREAT SERPL-MCNC: 1.57 MG/DL (ref 0.55–1.02)
CREAT SERPL-MCNC: 1.67 MG/DL (ref 0.55–1.02)
EOSINOPHIL # BLD AUTO: 0 X10^3/UL (ref 0–0.4)
EOSINOPHIL NFR BLD AUTO: 0 % (ref 1–7)
ERYTHROCYTE [DISTWIDTH] IN BLOOD BY AUTOMATED COUNT: 17.3 % (ref 9.6–15.2)
LYMPHOCYTES # BLD AUTO: 0.8 X10^3/UL (ref 1–3.4)
LYMPHOCYTES NFR BLD AUTO: 6 % (ref 22–44)
MCH RBC QN AUTO: 26.6 PG (ref 27–34.8)
MCHC RBC AUTO-ENTMCNC: 31.6 G/DL (ref 32.4–35.8)
MONOCYTES # BLD AUTO: 0.7 X10^3/UL (ref 0.2–0.8)
MONOCYTES NFR BLD AUTO: 5 % (ref 2–9)
NEUTROPHILS # BLD AUTO: 12.4 X10^3/UL (ref 1.8–6.8)
NEUTROPHILS NFR BLD AUTO: 89 % (ref 42–75)
O2/TOTAL GAS SETTING VFR VENT: 50 %
O2/TOTAL GAS SETTING VFR VENT: 65 %
O2/TOTAL GAS SETTING VFR VENT: 65 %
O2/TOTAL GAS SETTING VFR VENT: 75 %
O2/TOTAL GAS SETTING VFR VENT: 80 %
O2/TOTAL GAS SETTING VFR VENT: 90 %
PLATELET # BLD AUTO: 204 X10^3/UL (ref 130–400)
PMV BLD AUTO: 8.6 FL (ref 7.4–10.4)
PROT SERPL-MCNC: 6.6 G/DL (ref 6.4–8.2)
RBC # BLD AUTO: 5.86 X10^6/UL (ref 3.82–5.3)
TROPONIN I SERPL-MCNC: 0.07 NG/ML (ref 0–0.04)

## 2021-08-17 RX ADMIN — Medication SCH NOTE: at 12:30

## 2021-08-17 RX ADMIN — PROPOFOL PRN MLS/HR: 10 INJECTION, EMULSION INTRAVENOUS at 21:42

## 2021-08-17 RX ADMIN — DOPAMINE HYDROCHLORIDE IN DEXTROSE PRN MLS/HR: 1.6 INJECTION, SOLUTION INTRAVENOUS at 21:42

## 2021-08-17 RX ADMIN — DOXYCYCLINE SCH MLS/HR: 100 INJECTION, POWDER, LYOPHILIZED, FOR SOLUTION INTRAVENOUS at 22:40

## 2021-08-17 RX ADMIN — WHITE PETROLATUM SCH APPLIC: .15; .85 OINTMENT OPHTHALMIC at 19:40

## 2021-08-17 RX ADMIN — PROPOFOL PRN MLS/HR: 10 INJECTION, EMULSION INTRAVENOUS at 03:02

## 2021-08-17 RX ADMIN — Medication SCH NOTE: at 20:30

## 2021-08-17 RX ADMIN — Medication SCH NOTE: at 16:30

## 2021-08-17 RX ADMIN — CEFTRIAXONE SCH MLS/HR: 2 INJECTION, POWDER, FOR SOLUTION INTRAMUSCULAR; INTRAVENOUS at 17:52

## 2021-08-17 RX ADMIN — Medication SCH NOTE: at 08:30

## 2021-08-17 RX ADMIN — WHITE PETROLATUM SCH APPLIC: .15; .85 OINTMENT OPHTHALMIC at 02:33

## 2021-08-17 RX ADMIN — WHITE PETROLATUM SCH APPLIC: .15; .85 OINTMENT OPHTHALMIC at 12:00

## 2021-08-17 RX ADMIN — Medication SCH NOTE: at 00:30

## 2021-08-17 RX ADMIN — PROPOFOL PRN MLS/HR: 10 INJECTION, EMULSION INTRAVENOUS at 09:22

## 2021-08-17 RX ADMIN — PROPOFOL PRN MLS/HR: 10 INJECTION, EMULSION INTRAVENOUS at 15:36

## 2021-08-17 RX ADMIN — SODIUM CHLORIDE, SODIUM LACTATE, POTASSIUM CHLORIDE, AND CALCIUM CHLORIDE SCH MLS/HR: .6; .31; .03; .02 INJECTION, SOLUTION INTRAVENOUS at 22:30

## 2021-08-17 RX ADMIN — DOXYCYCLINE SCH MLS/HR: 100 INJECTION, POWDER, LYOPHILIZED, FOR SOLUTION INTRAVENOUS at 10:47

## 2021-08-17 RX ADMIN — SODIUM CHLORIDE, SODIUM LACTATE, POTASSIUM CHLORIDE, AND CALCIUM CHLORIDE SCH MLS/HR: .6; .31; .03; .02 INJECTION, SOLUTION INTRAVENOUS at 12:30

## 2021-08-17 RX ADMIN — PROPOFOL PRN MLS/HR: 10 INJECTION, EMULSION INTRAVENOUS at 00:40

## 2021-08-17 RX ADMIN — PROPOFOL PRN MLS/HR: 10 INJECTION, EMULSION INTRAVENOUS at 18:19

## 2021-08-17 RX ADMIN — PROPOFOL PRN MLS/HR: 10 INJECTION, EMULSION INTRAVENOUS at 05:47

## 2021-08-17 RX ADMIN — MIDAZOLAM HYDROCHLORIDE PRN MLS/HR: 5 INJECTION, SOLUTION INTRAMUSCULAR; INTRAVENOUS at 00:31

## 2021-08-17 RX ADMIN — MIDAZOLAM HYDROCHLORIDE PRN MLS/HR: 5 INJECTION, SOLUTION INTRAMUSCULAR; INTRAVENOUS at 15:05

## 2021-08-17 RX ADMIN — MIDAZOLAM HYDROCHLORIDE PRN MLS/HR: 5 INJECTION, SOLUTION INTRAMUSCULAR; INTRAVENOUS at 08:55

## 2021-08-17 RX ADMIN — DOPAMINE HYDROCHLORIDE IN DEXTROSE PRN MLS/HR: 1.6 INJECTION, SOLUTION INTRAVENOUS at 09:22

## 2021-08-17 RX ADMIN — DOPAMINE HYDROCHLORIDE IN DEXTROSE PRN MLS/HR: 1.6 INJECTION, SOLUTION INTRAVENOUS at 01:59

## 2021-08-17 RX ADMIN — DOPAMINE HYDROCHLORIDE IN DEXTROSE PRN MLS/HR: 1.6 INJECTION, SOLUTION INTRAVENOUS at 15:05

## 2021-08-17 RX ADMIN — FAMOTIDINE SCH MG: 10 INJECTION INTRAVENOUS at 21:32

## 2021-08-17 RX ADMIN — SODIUM CHLORIDE, SODIUM LACTATE, POTASSIUM CHLORIDE, AND CALCIUM CHLORIDE SCH MLS/HR: .6; .31; .03; .02 INJECTION, SOLUTION INTRAVENOUS at 03:03

## 2021-08-17 RX ADMIN — Medication SCH NOTE: at 04:30

## 2021-08-17 RX ADMIN — FENTANYL CITRATE PRN MLS/HR: 50 INJECTION INTRAVENOUS at 02:33

## 2021-08-17 RX ADMIN — DOCUSATE SODIUM 50MG AND SENNOSIDES 8.6MG SCH TAB: 8.6; 5 TABLET, FILM COATED ORAL at 09:00

## 2021-08-17 RX ADMIN — FENTANYL CITRATE PRN MLS/HR: 50 INJECTION INTRAVENOUS at 15:57

## 2021-08-18 LAB
ANION GAP SERPL CALC-SCNC: 9 MMOL/L (ref 5–15)
BASOPHILS # BLD AUTO: 0.1 X10^3/UL (ref 0–0.1)
BASOPHILS NFR BLD AUTO: 1 % (ref 0–1)
CALCIUM SERPL-MCNC: 8.7 MG/DL (ref 8.5–10.1)
CHLORIDE SERPL-SCNC: 111 MMOL/L (ref 98–107)
CREAT SERPL-MCNC: 1.27 MG/DL (ref 0.55–1.02)
EOSINOPHIL # BLD AUTO: 0.4 X10^3/UL (ref 0–0.4)
EOSINOPHIL NFR BLD AUTO: 4 % (ref 1–7)
ERYTHROCYTE [DISTWIDTH] IN BLOOD BY AUTOMATED COUNT: 18.1 % (ref 9.6–15.2)
LYMPHOCYTES # BLD AUTO: 1.2 X10^3/UL (ref 1–3.4)
LYMPHOCYTES NFR BLD AUTO: 11 % (ref 22–44)
MCH RBC QN AUTO: 26.7 PG (ref 27–34.8)
MCHC RBC AUTO-ENTMCNC: 31.4 G/DL (ref 32.4–35.8)
MONOCYTES # BLD AUTO: 0.7 X10^3/UL (ref 0.2–0.8)
MONOCYTES NFR BLD AUTO: 6 % (ref 2–9)
NEUTROPHILS # BLD AUTO: 8.8 X10^3/UL (ref 1.8–6.8)
NEUTROPHILS NFR BLD AUTO: 79 % (ref 42–75)
O2/TOTAL GAS SETTING VFR VENT: 70 %
PLATELET # BLD AUTO: 186 X10^3/UL (ref 130–400)
PMV BLD AUTO: 9.4 FL (ref 7.4–10.4)
RBC # BLD AUTO: 5.93 X10^6/UL (ref 3.82–5.3)

## 2021-08-18 RX ADMIN — WHITE PETROLATUM SCH APPLIC: .15; .85 OINTMENT OPHTHALMIC at 21:19

## 2021-08-18 RX ADMIN — DOPAMINE HYDROCHLORIDE IN DEXTROSE PRN MLS/HR: 1.6 INJECTION, SOLUTION INTRAVENOUS at 07:36

## 2021-08-18 RX ADMIN — DOCUSATE SODIUM 50MG AND SENNOSIDES 8.6MG SCH TAB: 8.6; 5 TABLET, FILM COATED ORAL at 08:44

## 2021-08-18 RX ADMIN — VECURONIUM BROMIDE PRN MLS/HR: 1 INJECTION, POWDER, LYOPHILIZED, FOR SOLUTION INTRAVENOUS at 22:42

## 2021-08-18 RX ADMIN — MIDAZOLAM HYDROCHLORIDE PRN MLS/HR: 5 INJECTION, SOLUTION INTRAMUSCULAR; INTRAVENOUS at 08:58

## 2021-08-18 RX ADMIN — SODIUM CHLORIDE, SODIUM LACTATE, POTASSIUM CHLORIDE, AND CALCIUM CHLORIDE SCH MLS/HR: .6; .31; .03; .02 INJECTION, SOLUTION INTRAVENOUS at 18:30

## 2021-08-18 RX ADMIN — Medication SCH NOTE: at 20:30

## 2021-08-18 RX ADMIN — PROPOFOL PRN MLS/HR: 10 INJECTION, EMULSION INTRAVENOUS at 16:57

## 2021-08-18 RX ADMIN — NOREPINEPHRINE BITARTRATE PRN MLS/HR: 1 INJECTION INTRAVENOUS at 21:15

## 2021-08-18 RX ADMIN — MIDAZOLAM HYDROCHLORIDE PRN MLS/HR: 5 INJECTION, SOLUTION INTRAMUSCULAR; INTRAVENOUS at 17:33

## 2021-08-18 RX ADMIN — FAMOTIDINE SCH MG: 10 INJECTION INTRAVENOUS at 21:19

## 2021-08-18 RX ADMIN — INSULIN HUMAN PRN MLS/HR: 100 INJECTION, SOLUTION PARENTERAL at 08:41

## 2021-08-18 RX ADMIN — DOPAMINE HYDROCHLORIDE IN DEXTROSE PRN MLS/HR: 1.6 INJECTION, SOLUTION INTRAVENOUS at 12:26

## 2021-08-18 RX ADMIN — PROPOFOL PRN MLS/HR: 10 INJECTION, EMULSION INTRAVENOUS at 12:25

## 2021-08-18 RX ADMIN — CEFTRIAXONE SCH MLS/HR: 2 INJECTION, POWDER, FOR SOLUTION INTRAMUSCULAR; INTRAVENOUS at 16:59

## 2021-08-18 RX ADMIN — Medication SCH NOTE: at 16:30

## 2021-08-18 RX ADMIN — PROPOFOL PRN MLS/HR: 10 INJECTION, EMULSION INTRAVENOUS at 02:24

## 2021-08-18 RX ADMIN — PROPOFOL PRN MLS/HR: 10 INJECTION, EMULSION INTRAVENOUS at 14:42

## 2021-08-18 RX ADMIN — Medication SCH NOTE: at 04:30

## 2021-08-18 RX ADMIN — DOXYCYCLINE SCH MLS/HR: 100 INJECTION, POWDER, LYOPHILIZED, FOR SOLUTION INTRAVENOUS at 10:33

## 2021-08-18 RX ADMIN — PROPOFOL PRN MLS/HR: 10 INJECTION, EMULSION INTRAVENOUS at 09:47

## 2021-08-18 RX ADMIN — PROPOFOL PRN MLS/HR: 10 INJECTION, EMULSION INTRAVENOUS at 06:33

## 2021-08-18 RX ADMIN — Medication SCH NOTE: at 08:30

## 2021-08-18 RX ADMIN — WHITE PETROLATUM SCH APPLIC: .15; .85 OINTMENT OPHTHALMIC at 11:36

## 2021-08-18 RX ADMIN — FENTANYL CITRATE PRN MLS/HR: 50 INJECTION INTRAVENOUS at 05:48

## 2021-08-18 RX ADMIN — DOPAMINE HYDROCHLORIDE IN DEXTROSE PRN MLS/HR: 1.6 INJECTION, SOLUTION INTRAVENOUS at 02:57

## 2021-08-18 RX ADMIN — WHITE PETROLATUM SCH APPLIC: .15; .85 OINTMENT OPHTHALMIC at 02:23

## 2021-08-18 RX ADMIN — Medication SCH NOTE: at 00:30

## 2021-08-18 RX ADMIN — MIDAZOLAM HYDROCHLORIDE PRN MLS/HR: 5 INJECTION, SOLUTION INTRAMUSCULAR; INTRAVENOUS at 02:23

## 2021-08-18 RX ADMIN — SODIUM CHLORIDE, SODIUM LACTATE, POTASSIUM CHLORIDE, AND CALCIUM CHLORIDE SCH MLS/HR: .6; .31; .03; .02 INJECTION, SOLUTION INTRAVENOUS at 08:30

## 2021-08-18 RX ADMIN — DOXYCYCLINE SCH MLS/HR: 100 INJECTION, POWDER, LYOPHILIZED, FOR SOLUTION INTRAVENOUS at 22:42

## 2021-08-18 RX ADMIN — Medication SCH NOTE: at 12:30

## 2021-08-18 RX ADMIN — FENTANYL CITRATE PRN MLS/HR: 50 INJECTION INTRAVENOUS at 21:14

## 2021-08-19 LAB
ANION GAP SERPL CALC-SCNC: 8 MMOL/L (ref 5–15)
BASOPHILS # BLD AUTO: 0.1 X10^3/UL (ref 0–0.1)
BASOPHILS NFR BLD AUTO: 1 % (ref 0–1)
CALCIUM SERPL-MCNC: 8 MG/DL (ref 8.5–10.1)
CHLORIDE SERPL-SCNC: 112 MMOL/L (ref 98–107)
CREAT SERPL-MCNC: 1.2 MG/DL (ref 0.55–1.02)
EOSINOPHIL # BLD AUTO: 0.2 X10^3/UL (ref 0–0.4)
EOSINOPHIL NFR BLD AUTO: 2 % (ref 1–7)
ERYTHROCYTE [DISTWIDTH] IN BLOOD BY AUTOMATED COUNT: 18.1 % (ref 9.6–15.2)
LYMPHOCYTES # BLD AUTO: 0.6 X10^3/UL (ref 1–3.4)
LYMPHOCYTES NFR BLD AUTO: 5 % (ref 22–44)
MCH RBC QN AUTO: 26.1 PG (ref 27–34.8)
MCHC RBC AUTO-ENTMCNC: 31.1 G/DL (ref 32.4–35.8)
MONOCYTES # BLD AUTO: 0.8 X10^3/UL (ref 0.2–0.8)
MONOCYTES NFR BLD AUTO: 6 % (ref 2–9)
NEUTROPHILS # BLD AUTO: 11.5 X10^3/UL (ref 1.8–6.8)
NEUTROPHILS NFR BLD AUTO: 87 % (ref 42–75)
O2/TOTAL GAS SETTING VFR VENT: 60 %
PLATELET # BLD AUTO: 181 X10^3/UL (ref 130–400)
PMV BLD AUTO: 8.8 FL (ref 7.4–10.4)
RBC # BLD AUTO: 5 X10^6/UL (ref 3.82–5.3)

## 2021-08-19 RX ADMIN — PROPOFOL PRN MLS/HR: 10 INJECTION, EMULSION INTRAVENOUS at 17:22

## 2021-08-19 RX ADMIN — PROPOFOL PRN MLS/HR: 10 INJECTION, EMULSION INTRAVENOUS at 12:35

## 2021-08-19 RX ADMIN — Medication SCH NOTE: at 12:30

## 2021-08-19 RX ADMIN — SODIUM CHLORIDE, SODIUM LACTATE, POTASSIUM CHLORIDE, AND CALCIUM CHLORIDE SCH MLS/HR: .6; .31; .03; .02 INJECTION, SOLUTION INTRAVENOUS at 04:30

## 2021-08-19 RX ADMIN — Medication SCH NOTE: at 20:30

## 2021-08-19 RX ADMIN — Medication SCH NOTE: at 08:30

## 2021-08-19 RX ADMIN — NOREPINEPHRINE BITARTRATE PRN MLS/HR: 1 INJECTION INTRAVENOUS at 18:04

## 2021-08-19 RX ADMIN — Medication SCH NOTE: at 00:30

## 2021-08-19 RX ADMIN — WHITE PETROLATUM SCH APPLIC: .15; .85 OINTMENT OPHTHALMIC at 02:34

## 2021-08-19 RX ADMIN — DOXYCYCLINE SCH MLS/HR: 100 INJECTION, POWDER, LYOPHILIZED, FOR SOLUTION INTRAVENOUS at 23:27

## 2021-08-19 RX ADMIN — VECURONIUM BROMIDE PRN MLS/HR: 1 INJECTION, POWDER, LYOPHILIZED, FOR SOLUTION INTRAVENOUS at 03:20

## 2021-08-19 RX ADMIN — CEFTRIAXONE SCH MLS/HR: 2 INJECTION, POWDER, FOR SOLUTION INTRAMUSCULAR; INTRAVENOUS at 17:23

## 2021-08-19 RX ADMIN — DOPAMINE HYDROCHLORIDE IN DEXTROSE PRN MLS/HR: 1.6 INJECTION, SOLUTION INTRAVENOUS at 03:00

## 2021-08-19 RX ADMIN — FAMOTIDINE SCH MG: 10 INJECTION INTRAVENOUS at 20:22

## 2021-08-19 RX ADMIN — Medication SCH NOTE: at 04:30

## 2021-08-19 RX ADMIN — WHITE PETROLATUM SCH APPLIC: .15; .85 OINTMENT OPHTHALMIC at 20:22

## 2021-08-19 RX ADMIN — PROPOFOL PRN MLS/HR: 10 INJECTION, EMULSION INTRAVENOUS at 04:00

## 2021-08-19 RX ADMIN — DOXYCYCLINE SCH MLS/HR: 100 INJECTION, POWDER, LYOPHILIZED, FOR SOLUTION INTRAVENOUS at 11:05

## 2021-08-19 RX ADMIN — PROPOFOL PRN MLS/HR: 10 INJECTION, EMULSION INTRAVENOUS at 14:29

## 2021-08-19 RX ADMIN — Medication SCH NOTE: at 13:10

## 2021-08-19 RX ADMIN — WHITE PETROLATUM SCH APPLIC: .15; .85 OINTMENT OPHTHALMIC at 11:06

## 2021-08-19 RX ADMIN — DOCUSATE SODIUM 50MG AND SENNOSIDES 8.6MG SCH TAB: 8.6; 5 TABLET, FILM COATED ORAL at 11:25

## 2021-08-19 RX ADMIN — PROPOFOL PRN MLS/HR: 10 INJECTION, EMULSION INTRAVENOUS at 23:27

## 2021-08-20 LAB
ANION GAP SERPL CALC-SCNC: 5 MMOL/L (ref 5–15)
BASOPHILS # BLD AUTO: 0 X10^3/UL (ref 0–0.1)
BASOPHILS NFR BLD AUTO: 1 % (ref 0–1)
CALCIUM SERPL-MCNC: 8.1 MG/DL (ref 8.5–10.1)
CHLORIDE SERPL-SCNC: 112 MMOL/L (ref 98–107)
CREAT SERPL-MCNC: 1.04 MG/DL (ref 0.55–1.02)
EOSINOPHIL # BLD AUTO: 0.3 X10^3/UL (ref 0–0.4)
EOSINOPHIL NFR BLD AUTO: 4 % (ref 1–7)
ERYTHROCYTE [DISTWIDTH] IN BLOOD BY AUTOMATED COUNT: 17.9 % (ref 9.6–15.2)
LYMPHOCYTES # BLD AUTO: 1.2 X10^3/UL (ref 1–3.4)
LYMPHOCYTES NFR BLD AUTO: 15 % (ref 22–44)
MCH RBC QN AUTO: 26.1 PG (ref 27–34.8)
MCHC RBC AUTO-ENTMCNC: 31.4 G/DL (ref 32.4–35.8)
MONOCYTES # BLD AUTO: 0.6 X10^3/UL (ref 0.2–0.8)
MONOCYTES NFR BLD AUTO: 8 % (ref 2–9)
NEUTROPHILS # BLD AUTO: 5.7 X10^3/UL (ref 1.8–6.8)
NEUTROPHILS NFR BLD AUTO: 72 % (ref 42–75)
O2/TOTAL GAS SETTING VFR VENT: 60 %
PLATELET # BLD AUTO: 159 X10^3/UL (ref 130–400)
PMV BLD AUTO: 9.1 FL (ref 7.4–10.4)
RBC # BLD AUTO: 4.8 X10^6/UL (ref 3.82–5.3)

## 2021-08-20 RX ADMIN — CEFTRIAXONE SCH MLS/HR: 2 INJECTION, POWDER, FOR SOLUTION INTRAMUSCULAR; INTRAVENOUS at 17:12

## 2021-08-20 RX ADMIN — PROPOFOL PRN MLS/HR: 10 INJECTION, EMULSION INTRAVENOUS at 17:12

## 2021-08-20 RX ADMIN — DOCUSATE SODIUM 50MG AND SENNOSIDES 8.6MG SCH TAB: 8.6; 5 TABLET, FILM COATED ORAL at 08:35

## 2021-08-20 RX ADMIN — WHITE PETROLATUM SCH APPLIC: .15; .85 OINTMENT OPHTHALMIC at 19:30

## 2021-08-20 RX ADMIN — DOXYCYCLINE SCH MLS/HR: 100 INJECTION, POWDER, LYOPHILIZED, FOR SOLUTION INTRAVENOUS at 11:42

## 2021-08-20 RX ADMIN — WHITE PETROLATUM SCH APPLIC: .15; .85 OINTMENT OPHTHALMIC at 11:25

## 2021-08-20 RX ADMIN — DOXYCYCLINE SCH MLS/HR: 100 INJECTION, POWDER, LYOPHILIZED, FOR SOLUTION INTRAVENOUS at 22:39

## 2021-08-20 RX ADMIN — PROPOFOL PRN MLS/HR: 10 INJECTION, EMULSION INTRAVENOUS at 22:39

## 2021-08-20 RX ADMIN — PROPOFOL PRN MLS/HR: 10 INJECTION, EMULSION INTRAVENOUS at 19:51

## 2021-08-20 RX ADMIN — FAMOTIDINE SCH MG: 10 INJECTION INTRAVENOUS at 08:35

## 2021-08-20 RX ADMIN — WHITE PETROLATUM SCH APPLIC: .15; .85 OINTMENT OPHTHALMIC at 03:39

## 2021-08-20 RX ADMIN — PROPOFOL PRN MLS/HR: 10 INJECTION, EMULSION INTRAVENOUS at 03:39

## 2021-08-20 RX ADMIN — ENOXAPARIN SODIUM SCH MG: 100 INJECTION SUBCUTANEOUS at 09:43

## 2021-08-20 RX ADMIN — PROPOFOL PRN MLS/HR: 10 INJECTION, EMULSION INTRAVENOUS at 11:25

## 2021-08-20 RX ADMIN — PROPOFOL PRN MLS/HR: 10 INJECTION, EMULSION INTRAVENOUS at 14:51

## 2021-08-20 RX ADMIN — PROPOFOL PRN MLS/HR: 10 INJECTION, EMULSION INTRAVENOUS at 05:46

## 2021-08-20 RX ADMIN — PROPOFOL PRN MLS/HR: 10 INJECTION, EMULSION INTRAVENOUS at 01:26

## 2021-08-20 RX ADMIN — FAMOTIDINE SCH MG: 10 INJECTION INTRAVENOUS at 21:47

## 2021-08-20 RX ADMIN — PROPOFOL PRN MLS/HR: 10 INJECTION, EMULSION INTRAVENOUS at 08:35

## 2021-08-20 RX ADMIN — Medication SCH NOTE: at 04:30

## 2021-08-20 RX ADMIN — Medication SCH NOTE: at 00:30

## 2021-08-20 NOTE — NUR
UPDATED TF RECS: Vital HP: goal 40mL/hr ON propofol, 70 mL/hr OFF propofol

Add 2 packets beneprotein BID 

-------------------------------------------------------------------------------

Addendum: 08/20/21 at 1547 by Sandra Pruitt RD

-------------------------------------------------------------------------------

Amended: Links added.

## 2021-08-21 LAB
ANION GAP SERPL CALC-SCNC: 7 MMOL/L (ref 5–15)
BASOPHILS # BLD AUTO: 0.1 X10^3/UL (ref 0–0.1)
BASOPHILS NFR BLD AUTO: 1 % (ref 0–1)
CALCIUM SERPL-MCNC: 8.1 MG/DL (ref 8.5–10.1)
CHLORIDE SERPL-SCNC: 109 MMOL/L (ref 98–107)
CREAT SERPL-MCNC: 1.26 MG/DL (ref 0.55–1.02)
EOSINOPHIL # BLD AUTO: 0.3 X10^3/UL (ref 0–0.4)
EOSINOPHIL NFR BLD AUTO: 4 % (ref 1–7)
ERYTHROCYTE [DISTWIDTH] IN BLOOD BY AUTOMATED COUNT: 17.6 % (ref 9.6–15.2)
LYMPHOCYTES # BLD AUTO: 1 X10^3/UL (ref 1–3.4)
LYMPHOCYTES NFR BLD AUTO: 13 % (ref 22–44)
MCH RBC QN AUTO: 26 PG (ref 27–34.8)
MCHC RBC AUTO-ENTMCNC: 31.2 G/DL (ref 32.4–35.8)
MONOCYTES # BLD AUTO: 0.9 X10^3/UL (ref 0.2–0.8)
MONOCYTES NFR BLD AUTO: 12 % (ref 2–9)
NEUTROPHILS # BLD AUTO: 5.6 X10^3/UL (ref 1.8–6.8)
NEUTROPHILS NFR BLD AUTO: 71 % (ref 42–75)
O2/TOTAL GAS SETTING VFR VENT: 60 %
PLATELET # BLD AUTO: 152 X10^3/UL (ref 130–400)
PMV BLD AUTO: 8.8 FL (ref 7.4–10.4)
RBC # BLD AUTO: 4.74 X10^6/UL (ref 3.82–5.3)

## 2021-08-21 RX ADMIN — PROPOFOL PRN MLS/HR: 10 INJECTION, EMULSION INTRAVENOUS at 17:52

## 2021-08-21 RX ADMIN — PROPOFOL PRN MLS/HR: 10 INJECTION, EMULSION INTRAVENOUS at 00:56

## 2021-08-21 RX ADMIN — CEFTRIAXONE SCH MLS/HR: 2 INJECTION, POWDER, FOR SOLUTION INTRAMUSCULAR; INTRAVENOUS at 17:51

## 2021-08-21 RX ADMIN — DOXYCYCLINE SCH MLS/HR: 100 INJECTION, POWDER, LYOPHILIZED, FOR SOLUTION INTRAVENOUS at 22:54

## 2021-08-21 RX ADMIN — PROPOFOL PRN MLS/HR: 10 INJECTION, EMULSION INTRAVENOUS at 03:32

## 2021-08-21 RX ADMIN — PROPOFOL PRN MLS/HR: 10 INJECTION, EMULSION INTRAVENOUS at 06:19

## 2021-08-21 RX ADMIN — PROPOFOL PRN MLS/HR: 10 INJECTION, EMULSION INTRAVENOUS at 23:33

## 2021-08-21 RX ADMIN — ENOXAPARIN SODIUM SCH MG: 100 INJECTION SUBCUTANEOUS at 08:51

## 2021-08-21 RX ADMIN — PROPOFOL PRN MLS/HR: 10 INJECTION, EMULSION INTRAVENOUS at 21:17

## 2021-08-21 RX ADMIN — DOCUSATE SODIUM 50MG AND SENNOSIDES 8.6MG SCH TAB: 8.6; 5 TABLET, FILM COATED ORAL at 08:50

## 2021-08-21 RX ADMIN — FAMOTIDINE SCH MG: 10 INJECTION INTRAVENOUS at 08:50

## 2021-08-21 RX ADMIN — FAMOTIDINE SCH MG: 10 INJECTION INTRAVENOUS at 21:16

## 2021-08-21 RX ADMIN — DOXYCYCLINE SCH MLS/HR: 100 INJECTION, POWDER, LYOPHILIZED, FOR SOLUTION INTRAVENOUS at 10:52

## 2021-08-22 LAB
ANION GAP SERPL CALC-SCNC: 6 MMOL/L (ref 5–15)
BASOPHILS # BLD AUTO: 0.1 X10^3/UL (ref 0–0.1)
BASOPHILS NFR BLD AUTO: 1 % (ref 0–1)
CALCIUM SERPL-MCNC: 8.1 MG/DL (ref 8.5–10.1)
CHLORIDE SERPL-SCNC: 111 MMOL/L (ref 98–107)
CREAT SERPL-MCNC: 1.03 MG/DL (ref 0.55–1.02)
EOSINOPHIL # BLD AUTO: 0.3 X10^3/UL (ref 0–0.4)
EOSINOPHIL NFR BLD AUTO: 5 % (ref 1–7)
ERYTHROCYTE [DISTWIDTH] IN BLOOD BY AUTOMATED COUNT: 17.6 % (ref 9.6–15.2)
LYMPHOCYTES # BLD AUTO: 1.1 X10^3/UL (ref 1–3.4)
LYMPHOCYTES NFR BLD AUTO: 18 % (ref 22–44)
MCH RBC QN AUTO: 26.1 PG (ref 27–34.8)
MCHC RBC AUTO-ENTMCNC: 31.5 G/DL (ref 32.4–35.8)
MONOCYTES # BLD AUTO: 0.8 X10^3/UL (ref 0.2–0.8)
MONOCYTES NFR BLD AUTO: 13 % (ref 2–9)
NEUTROPHILS # BLD AUTO: 3.9 X10^3/UL (ref 1.8–6.8)
NEUTROPHILS NFR BLD AUTO: 63 % (ref 42–75)
O2/TOTAL GAS SETTING VFR VENT: 50 %
PLATELET # BLD AUTO: 147 X10^3/UL (ref 130–400)
PMV BLD AUTO: 8.6 FL (ref 7.4–10.4)
RBC # BLD AUTO: 4.69 X10^6/UL (ref 3.82–5.3)
TRIGL SERPL-MCNC: 129 MG/DL (ref 50–200)

## 2021-08-22 RX ADMIN — PROPOFOL PRN MLS/HR: 10 INJECTION, EMULSION INTRAVENOUS at 05:16

## 2021-08-22 RX ADMIN — DOCUSATE SODIUM 50MG AND SENNOSIDES 8.6MG SCH TAB: 8.6; 5 TABLET, FILM COATED ORAL at 09:11

## 2021-08-22 RX ADMIN — ENOXAPARIN SODIUM SCH MG: 100 INJECTION SUBCUTANEOUS at 09:13

## 2021-08-22 RX ADMIN — SCOLOPAMINE TRANSDERMAL SYSTEM SCH PATCH: 1 PATCH, EXTENDED RELEASE TRANSDERMAL at 09:11

## 2021-08-22 RX ADMIN — CEFTRIAXONE SCH MLS/HR: 2 INJECTION, POWDER, FOR SOLUTION INTRAMUSCULAR; INTRAVENOUS at 17:55

## 2021-08-22 RX ADMIN — FAMOTIDINE SCH MG: 10 INJECTION INTRAVENOUS at 21:23

## 2021-08-22 RX ADMIN — DOXYCYCLINE SCH MLS/HR: 100 INJECTION, POWDER, LYOPHILIZED, FOR SOLUTION INTRAVENOUS at 11:08

## 2021-08-22 RX ADMIN — FAMOTIDINE SCH MG: 10 INJECTION INTRAVENOUS at 09:10

## 2021-08-22 RX ADMIN — PROPOFOL PRN MLS/HR: 10 INJECTION, EMULSION INTRAVENOUS at 02:19

## 2021-08-22 RX ADMIN — DOXYCYCLINE SCH MLS/HR: 100 INJECTION, POWDER, LYOPHILIZED, FOR SOLUTION INTRAVENOUS at 22:23

## 2021-08-23 LAB
ANION GAP SERPL CALC-SCNC: 6 MMOL/L (ref 5–15)
BASOPHILS # BLD AUTO: 0.1 X10^3/UL (ref 0–0.1)
BASOPHILS NFR BLD AUTO: 1 % (ref 0–1)
CALCIUM SERPL-MCNC: 8.5 MG/DL (ref 8.5–10.1)
CHLORIDE SERPL-SCNC: 110 MMOL/L (ref 98–107)
CLOSTRIDIUM DIFFICILE ANTIGEN: NEGATIVE
CLOSTRIDIUM DIFFICILE TOXIN: NEGATIVE
CREAT SERPL-MCNC: 1 MG/DL (ref 0.55–1.02)
EOSINOPHIL # BLD AUTO: 0.1 X10^3/UL (ref 0–0.4)
EOSINOPHIL NFR BLD AUTO: 2 % (ref 1–7)
ERYTHROCYTE [DISTWIDTH] IN BLOOD BY AUTOMATED COUNT: 18 % (ref 9.6–15.2)
LYMPHOCYTES # BLD AUTO: 0.6 X10^3/UL (ref 1–3.4)
LYMPHOCYTES NFR BLD AUTO: 8 % (ref 22–44)
MCH RBC QN AUTO: 26.6 PG (ref 27–34.8)
MCHC RBC AUTO-ENTMCNC: 32 G/DL (ref 32.4–35.8)
MONOCYTES # BLD AUTO: 1.1 X10^3/UL (ref 0.2–0.8)
MONOCYTES NFR BLD AUTO: 14 % (ref 2–9)
NEUTROPHILS # BLD AUTO: 5.8 X10^3/UL (ref 1.8–6.8)
NEUTROPHILS NFR BLD AUTO: 76 % (ref 42–75)
O2/TOTAL GAS SETTING VFR VENT: 50 %
PLATELET # BLD AUTO: 161 X10^3/UL (ref 130–400)
PMV BLD AUTO: 8.9 FL (ref 7.4–10.4)
RBC # BLD AUTO: 4.98 X10^6/UL (ref 3.82–5.3)

## 2021-08-23 RX ADMIN — CEFTRIAXONE SCH MLS/HR: 2 INJECTION, POWDER, FOR SOLUTION INTRAMUSCULAR; INTRAVENOUS at 18:31

## 2021-08-23 RX ADMIN — DOXYCYCLINE SCH MLS/HR: 100 INJECTION, POWDER, LYOPHILIZED, FOR SOLUTION INTRAVENOUS at 10:43

## 2021-08-23 RX ADMIN — PHENYTOIN SODIUM SCH MG: 50 INJECTION INTRAMUSCULAR; INTRAVENOUS at 18:31

## 2021-08-23 RX ADMIN — ENOXAPARIN SODIUM SCH MG: 100 INJECTION SUBCUTANEOUS at 09:29

## 2021-08-23 RX ADMIN — FAMOTIDINE SCH MG: 10 INJECTION INTRAVENOUS at 09:29

## 2021-08-23 RX ADMIN — DOCUSATE SODIUM 50MG AND SENNOSIDES 8.6MG SCH TAB: 8.6; 5 TABLET, FILM COATED ORAL at 09:00

## 2021-08-23 RX ADMIN — PHENYTOIN SODIUM SCH MG: 50 INJECTION INTRAMUSCULAR; INTRAVENOUS at 11:25

## 2021-08-23 RX ADMIN — FAMOTIDINE SCH MG: 10 INJECTION INTRAVENOUS at 21:07

## 2021-08-24 LAB
ANION GAP SERPL CALC-SCNC: 6 MMOL/L (ref 5–15)
BASOPHILS # BLD AUTO: 0.1 X10^3/UL (ref 0–0.1)
BASOPHILS NFR BLD AUTO: 1 % (ref 0–1)
CALCIUM SERPL-MCNC: 8.8 MG/DL (ref 8.5–10.1)
CHLORIDE SERPL-SCNC: 113 MMOL/L (ref 98–107)
CREAT SERPL-MCNC: 0.87 MG/DL (ref 0.55–1.02)
EOSINOPHIL # BLD AUTO: 0.1 X10^3/UL (ref 0–0.4)
EOSINOPHIL NFR BLD AUTO: 2 % (ref 1–7)
ERYTHROCYTE [DISTWIDTH] IN BLOOD BY AUTOMATED COUNT: 17.8 % (ref 9.6–15.2)
LYMPHOCYTES # BLD AUTO: 0.6 X10^3/UL (ref 1–3.4)
LYMPHOCYTES NFR BLD AUTO: 9 % (ref 22–44)
MCH RBC QN AUTO: 26.4 PG (ref 27–34.8)
MCHC RBC AUTO-ENTMCNC: 31.7 G/DL (ref 32.4–35.8)
MONOCYTES # BLD AUTO: 0.9 X10^3/UL (ref 0.2–0.8)
MONOCYTES NFR BLD AUTO: 13 % (ref 2–9)
NEUTROPHILS # BLD AUTO: 5.4 X10^3/UL (ref 1.8–6.8)
NEUTROPHILS NFR BLD AUTO: 76 % (ref 42–75)
O2/TOTAL GAS SETTING VFR VENT: 50 %
PLATELET # BLD AUTO: 145 X10^3/UL (ref 130–400)
PMV BLD AUTO: 9.2 FL (ref 7.4–10.4)
RBC # BLD AUTO: 4.9 X10^6/UL (ref 3.82–5.3)

## 2021-08-24 RX ADMIN — CEFTRIAXONE SCH MLS/HR: 2 INJECTION, POWDER, FOR SOLUTION INTRAMUSCULAR; INTRAVENOUS at 17:29

## 2021-08-24 RX ADMIN — FAMOTIDINE SCH MG: 10 INJECTION INTRAVENOUS at 08:24

## 2021-08-24 RX ADMIN — PHENYTOIN SODIUM SCH MG: 50 INJECTION INTRAMUSCULAR; INTRAVENOUS at 17:29

## 2021-08-24 RX ADMIN — PHENYTOIN SODIUM SCH MG: 50 INJECTION INTRAMUSCULAR; INTRAVENOUS at 10:34

## 2021-08-24 RX ADMIN — PHENYTOIN SODIUM SCH MG: 50 INJECTION INTRAMUSCULAR; INTRAVENOUS at 01:45

## 2021-08-24 RX ADMIN — DOCUSATE SODIUM 50MG AND SENNOSIDES 8.6MG SCH TAB: 8.6; 5 TABLET, FILM COATED ORAL at 08:24

## 2021-08-24 RX ADMIN — ENOXAPARIN SODIUM SCH MG: 100 INJECTION SUBCUTANEOUS at 08:24

## 2021-08-24 RX ADMIN — ENOXAPARIN SODIUM SCH MG: 40 INJECTION SUBCUTANEOUS at 21:18

## 2021-08-24 RX ADMIN — FAMOTIDINE SCH MG: 10 INJECTION INTRAVENOUS at 21:18

## 2021-08-25 LAB
ANION GAP SERPL CALC-SCNC: 5 MMOL/L (ref 5–15)
BASOPHILS # BLD AUTO: 0.1 X10^3/UL (ref 0–0.1)
BASOPHILS NFR BLD AUTO: 1 % (ref 0–1)
CALCIUM SERPL-MCNC: 8.6 MG/DL (ref 8.5–10.1)
CHLORIDE SERPL-SCNC: 113 MMOL/L (ref 98–107)
CREAT SERPL-MCNC: 0.83 MG/DL (ref 0.55–1.02)
EOSINOPHIL # BLD AUTO: 0.2 X10^3/UL (ref 0–0.4)
EOSINOPHIL NFR BLD AUTO: 2 % (ref 1–7)
ERYTHROCYTE [DISTWIDTH] IN BLOOD BY AUTOMATED COUNT: 18.1 % (ref 9.6–15.2)
LYMPHOCYTES # BLD AUTO: 1 X10^3/UL (ref 1–3.4)
LYMPHOCYTES NFR BLD AUTO: 12 % (ref 22–44)
MCH RBC QN AUTO: 26.1 PG (ref 27–34.8)
MCHC RBC AUTO-ENTMCNC: 31.5 G/DL (ref 32.4–35.8)
MONOCYTES # BLD AUTO: 1 X10^3/UL (ref 0.2–0.8)
MONOCYTES NFR BLD AUTO: 11 % (ref 2–9)
NEUTROPHILS # BLD AUTO: 6.3 X10^3/UL (ref 1.8–6.8)
NEUTROPHILS NFR BLD AUTO: 74 % (ref 42–75)
O2/TOTAL GAS SETTING VFR VENT: 50 %
PLATELET # BLD AUTO: 164 X10^3/UL (ref 130–400)
PMV BLD AUTO: 9.5 FL (ref 7.4–10.4)
RBC # BLD AUTO: 4.88 X10^6/UL (ref 3.82–5.3)

## 2021-08-25 RX ADMIN — OXYCODONE HYDROCHLORIDE PRN MG: 5 TABLET ORAL at 16:45

## 2021-08-25 RX ADMIN — FAMOTIDINE SCH MG: 10 INJECTION INTRAVENOUS at 21:25

## 2021-08-25 RX ADMIN — LORAZEPAM PRN MG: 2 INJECTION INTRAMUSCULAR; INTRAVENOUS at 23:21

## 2021-08-25 RX ADMIN — LORAZEPAM PRN MG: 2 INJECTION INTRAMUSCULAR; INTRAVENOUS at 05:29

## 2021-08-25 RX ADMIN — LORAZEPAM PRN MG: 2 INJECTION INTRAMUSCULAR; INTRAVENOUS at 01:40

## 2021-08-25 RX ADMIN — DOCUSATE SODIUM 50MG AND SENNOSIDES 8.6MG SCH TAB: 8.6; 5 TABLET, FILM COATED ORAL at 08:33

## 2021-08-25 RX ADMIN — ACETAMINOPHEN PRN MG: 325 TABLET, FILM COATED ORAL at 16:45

## 2021-08-25 RX ADMIN — ENOXAPARIN SODIUM SCH MG: 40 INJECTION SUBCUTANEOUS at 08:32

## 2021-08-25 RX ADMIN — PHENYTOIN SODIUM SCH MG: 50 INJECTION INTRAMUSCULAR; INTRAVENOUS at 18:04

## 2021-08-25 RX ADMIN — CEFTRIAXONE SCH MLS/HR: 2 INJECTION, POWDER, FOR SOLUTION INTRAMUSCULAR; INTRAVENOUS at 17:16

## 2021-08-25 RX ADMIN — ENOXAPARIN SODIUM SCH MG: 40 INJECTION SUBCUTANEOUS at 21:25

## 2021-08-25 RX ADMIN — LORAZEPAM PRN MG: 2 INJECTION INTRAMUSCULAR; INTRAVENOUS at 10:43

## 2021-08-25 RX ADMIN — PHENYTOIN SODIUM SCH MG: 50 INJECTION INTRAMUSCULAR; INTRAVENOUS at 10:43

## 2021-08-25 RX ADMIN — ENALAPRILAT PRN MG: 1.25 INJECTION, SOLUTION INTRAVENOUS at 05:29

## 2021-08-25 RX ADMIN — SCOLOPAMINE TRANSDERMAL SYSTEM SCH PATCH: 1 PATCH, EXTENDED RELEASE TRANSDERMAL at 08:32

## 2021-08-25 RX ADMIN — PHENYTOIN SODIUM SCH MG: 50 INJECTION INTRAMUSCULAR; INTRAVENOUS at 01:40

## 2021-08-25 RX ADMIN — LORAZEPAM PRN MG: 2 INJECTION INTRAMUSCULAR; INTRAVENOUS at 16:45

## 2021-08-25 RX ADMIN — FAMOTIDINE SCH MG: 10 INJECTION INTRAVENOUS at 08:31

## 2021-08-26 LAB
ANION GAP SERPL CALC-SCNC: 4 MMOL/L (ref 5–15)
BASOPHILS # BLD AUTO: 0.1 X10^3/UL (ref 0–0.1)
BASOPHILS NFR BLD AUTO: 1 % (ref 0–1)
CALCIUM SERPL-MCNC: 8.6 MG/DL (ref 8.5–10.1)
CHLORIDE SERPL-SCNC: 113 MMOL/L (ref 98–107)
CREAT SERPL-MCNC: 0.8 MG/DL (ref 0.55–1.02)
EOSINOPHIL # BLD AUTO: 0.3 X10^3/UL (ref 0–0.4)
EOSINOPHIL NFR BLD AUTO: 4 % (ref 1–7)
ERYTHROCYTE [DISTWIDTH] IN BLOOD BY AUTOMATED COUNT: 18.3 % (ref 9.6–15.2)
LYMPHOCYTES # BLD AUTO: 1.3 X10^3/UL (ref 1–3.4)
LYMPHOCYTES NFR BLD AUTO: 15 % (ref 22–44)
MCH RBC QN AUTO: 26.2 PG (ref 27–34.8)
MCHC RBC AUTO-ENTMCNC: 31.9 G/DL (ref 32.4–35.8)
MONOCYTES # BLD AUTO: 0.9 X10^3/UL (ref 0.2–0.8)
MONOCYTES NFR BLD AUTO: 10 % (ref 2–9)
NEUTROPHILS # BLD AUTO: 6.2 X10^3/UL (ref 1.8–6.8)
NEUTROPHILS NFR BLD AUTO: 71 % (ref 42–75)
O2/TOTAL GAS SETTING VFR VENT: 40 %
PLATELET # BLD AUTO: 167 X10^3/UL (ref 130–400)
PMV BLD AUTO: 9.8 FL (ref 7.4–10.4)
RBC # BLD AUTO: 4.88 X10^6/UL (ref 3.82–5.3)

## 2021-08-26 RX ADMIN — ENOXAPARIN SODIUM SCH MG: 40 INJECTION SUBCUTANEOUS at 08:31

## 2021-08-26 RX ADMIN — ENALAPRILAT PRN MG: 1.25 INJECTION, SOLUTION INTRAVENOUS at 03:02

## 2021-08-26 RX ADMIN — LORAZEPAM PRN MG: 2 INJECTION INTRAMUSCULAR; INTRAVENOUS at 06:34

## 2021-08-26 RX ADMIN — PHENYTOIN SODIUM SCH MG: 50 INJECTION INTRAMUSCULAR; INTRAVENOUS at 09:39

## 2021-08-26 RX ADMIN — ENALAPRILAT PRN MG: 1.25 INJECTION, SOLUTION INTRAVENOUS at 09:39

## 2021-08-26 RX ADMIN — OXYCODONE HYDROCHLORIDE PRN MG: 5 TABLET ORAL at 10:33

## 2021-08-26 RX ADMIN — PHENYTOIN SODIUM SCH MG: 50 INJECTION INTRAMUSCULAR; INTRAVENOUS at 02:23

## 2021-08-26 RX ADMIN — DOCUSATE SODIUM 50MG AND SENNOSIDES 8.6MG SCH TAB: 8.6; 5 TABLET, FILM COATED ORAL at 08:38

## 2021-08-26 RX ADMIN — ACETAMINOPHEN PRN MG: 325 TABLET, FILM COATED ORAL at 10:32

## 2021-08-26 RX ADMIN — FENTANYL CITRATE PRN MLS/HR: 50 INJECTION INTRAVENOUS at 15:03

## 2021-08-26 RX ADMIN — FAMOTIDINE SCH MG: 10 INJECTION INTRAVENOUS at 08:31

## 2021-08-26 RX ADMIN — LORAZEPAM PRN MG: 2 INJECTION INTRAMUSCULAR; INTRAVENOUS at 02:23

## 2021-09-20 ENCOUNTER — TELEPHONE (OUTPATIENT)
Dept: PHYSICAL MEDICINE AND REHAB | Facility: REHABILITATION | Age: 61
End: 2021-09-20